# Patient Record
Sex: FEMALE | Race: WHITE | NOT HISPANIC OR LATINO | Employment: FULL TIME | URBAN - METROPOLITAN AREA
[De-identification: names, ages, dates, MRNs, and addresses within clinical notes are randomized per-mention and may not be internally consistent; named-entity substitution may affect disease eponyms.]

---

## 2021-01-16 ENCOUNTER — APPOINTMENT (EMERGENCY)
Dept: RADIOLOGY | Facility: HOSPITAL | Age: 31
DRG: 494 | End: 2021-01-16
Payer: COMMERCIAL

## 2021-01-16 ENCOUNTER — HOSPITAL ENCOUNTER (INPATIENT)
Facility: HOSPITAL | Age: 31
LOS: 2 days | Discharge: HOME | DRG: 494 | End: 2021-01-18
Attending: EMERGENCY MEDICINE | Admitting: ORTHOPAEDIC SURGERY
Payer: COMMERCIAL

## 2021-01-16 ENCOUNTER — ANESTHESIA EVENT (INPATIENT)
Dept: PERIOP | Facility: HOSPITAL | Age: 31
DRG: 494 | End: 2021-01-16
Payer: COMMERCIAL

## 2021-01-16 ENCOUNTER — APPOINTMENT (INPATIENT)
Dept: RADIOLOGY | Facility: HOSPITAL | Age: 31
DRG: 494 | End: 2021-01-16
Payer: COMMERCIAL

## 2021-01-16 DIAGNOSIS — S82.402A FRACTURE OF TIBIA AND FIBULA, SHAFT, LEFT, CLOSED, INITIAL ENCOUNTER: ICD-10-CM

## 2021-01-16 DIAGNOSIS — Z87.81 STATUS POST OPEN REDUCTION AND INTERNAL FIXATION (ORIF) OF FRACTURE: Primary | ICD-10-CM

## 2021-01-16 DIAGNOSIS — Z98.890 STATUS POST OPEN REDUCTION AND INTERNAL FIXATION (ORIF) OF FRACTURE: Primary | ICD-10-CM

## 2021-01-16 DIAGNOSIS — S82.202A FRACTURE OF TIBIA AND FIBULA, SHAFT, LEFT, CLOSED, INITIAL ENCOUNTER: ICD-10-CM

## 2021-01-16 DIAGNOSIS — S82.402A CLOSED FRACTURE SHAFT FIBULA AND TIBIA, LEFT, INITIAL ENCOUNTER: ICD-10-CM

## 2021-01-16 DIAGNOSIS — S82.202A CLOSED FRACTURE SHAFT FIBULA AND TIBIA, LEFT, INITIAL ENCOUNTER: ICD-10-CM

## 2021-01-16 PROBLEM — E55.9 VITAMIN D DEFICIENCY: Status: ACTIVE | Noted: 2021-01-16

## 2021-01-16 LAB
25(OH)D3 SERPL-MCNC: 24.3 NG/ML (ref 30–100)
ABO GROUP BLD: NORMAL
ANION GAP SERPL CALCULATED.3IONS-SCNC: 5 MMOL/L (ref 4–13)
APTT PPP: 28 SECONDS (ref 23–37)
BLD GP AB SCN SERPL QL: NEGATIVE
BUN SERPL-MCNC: 17 MG/DL (ref 5–25)
CALCIUM SERPL-MCNC: 9.4 MG/DL (ref 8.3–10.1)
CHLORIDE SERPL-SCNC: 108 MMOL/L (ref 100–108)
CO2 SERPL-SCNC: 25 MMOL/L (ref 21–32)
CREAT SERPL-MCNC: 0.83 MG/DL (ref 0.6–1.3)
ERYTHROCYTE [DISTWIDTH] IN BLOOD BY AUTOMATED COUNT: 12.9 % (ref 11.6–15.1)
FLUAV RNA RESP QL NAA+PROBE: NEGATIVE
FLUBV RNA RESP QL NAA+PROBE: NEGATIVE
GFR SERPL CREATININE-BSD FRML MDRD: 94 ML/MIN/1.73SQ M
GLUCOSE SERPL-MCNC: 81 MG/DL (ref 65–140)
HCT VFR BLD AUTO: 41.2 % (ref 34.8–46.1)
HGB BLD-MCNC: 13.3 G/DL (ref 11.5–15.4)
INR PPP: 0.96 (ref 0.84–1.19)
MCH RBC QN AUTO: 29.4 PG (ref 26.8–34.3)
MCHC RBC AUTO-ENTMCNC: 32.3 G/DL (ref 31.4–37.4)
MCV RBC AUTO: 91 FL (ref 82–98)
PLATELET # BLD AUTO: 251 THOUSANDS/UL (ref 149–390)
PMV BLD AUTO: 12.9 FL (ref 8.9–12.7)
POTASSIUM SERPL-SCNC: 3.9 MMOL/L (ref 3.5–5.3)
PROTHROMBIN TIME: 12.8 SECONDS (ref 11.6–14.5)
RBC # BLD AUTO: 4.52 MILLION/UL (ref 3.81–5.12)
RH BLD: POSITIVE
RSV RNA RESP QL NAA+PROBE: NEGATIVE
SARS-COV-2 RNA RESP QL NAA+PROBE: NEGATIVE
SODIUM SERPL-SCNC: 138 MMOL/L (ref 136–145)
SPECIMEN EXPIRATION DATE: NORMAL
WBC # BLD AUTO: 10.93 THOUSAND/UL (ref 4.31–10.16)

## 2021-01-16 PROCEDURE — 36415 COLL VENOUS BLD VENIPUNCTURE: CPT | Performed by: ORTHOPAEDIC SURGERY

## 2021-01-16 PROCEDURE — 96375 TX/PRO/DX INJ NEW DRUG ADDON: CPT

## 2021-01-16 PROCEDURE — 85610 PROTHROMBIN TIME: CPT | Performed by: ORTHOPAEDIC SURGERY

## 2021-01-16 PROCEDURE — 93005 ELECTROCARDIOGRAM TRACING: CPT

## 2021-01-16 PROCEDURE — 85730 THROMBOPLASTIN TIME PARTIAL: CPT | Performed by: ORTHOPAEDIC SURGERY

## 2021-01-16 PROCEDURE — 73590 X-RAY EXAM OF LOWER LEG: CPT

## 2021-01-16 PROCEDURE — 86900 BLOOD TYPING SEROLOGIC ABO: CPT | Performed by: ORTHOPAEDIC SURGERY

## 2021-01-16 PROCEDURE — 73564 X-RAY EXAM KNEE 4 OR MORE: CPT

## 2021-01-16 PROCEDURE — 0241U HB NFCT DS VIR RESP RNA 4 TRGT: CPT | Performed by: ORTHOPAEDIC SURGERY

## 2021-01-16 PROCEDURE — 99285 EMERGENCY DEPT VISIT HI MDM: CPT

## 2021-01-16 PROCEDURE — 86850 RBC ANTIBODY SCREEN: CPT | Performed by: ORTHOPAEDIC SURGERY

## 2021-01-16 PROCEDURE — 99285 EMERGENCY DEPT VISIT HI MDM: CPT | Performed by: EMERGENCY MEDICINE

## 2021-01-16 PROCEDURE — 96374 THER/PROPH/DIAG INJ IV PUSH: CPT

## 2021-01-16 PROCEDURE — 73610 X-RAY EXAM OF ANKLE: CPT

## 2021-01-16 PROCEDURE — 82306 VITAMIN D 25 HYDROXY: CPT | Performed by: ORTHOPAEDIC SURGERY

## 2021-01-16 PROCEDURE — 86901 BLOOD TYPING SEROLOGIC RH(D): CPT | Performed by: ORTHOPAEDIC SURGERY

## 2021-01-16 PROCEDURE — 80048 BASIC METABOLIC PNL TOTAL CA: CPT | Performed by: ORTHOPAEDIC SURGERY

## 2021-01-16 PROCEDURE — 85027 COMPLETE CBC AUTOMATED: CPT | Performed by: ORTHOPAEDIC SURGERY

## 2021-01-16 PROCEDURE — 71045 X-RAY EXAM CHEST 1 VIEW: CPT

## 2021-01-16 PROCEDURE — 73700 CT LOWER EXTREMITY W/O DYE: CPT

## 2021-01-16 PROCEDURE — 96376 TX/PRO/DX INJ SAME DRUG ADON: CPT

## 2021-01-16 RX ORDER — ACETAMINOPHEN 325 MG/1
975 TABLET ORAL EVERY 8 HOURS SCHEDULED
Status: DISCONTINUED | OUTPATIENT
Start: 2021-01-16 | End: 2021-01-17

## 2021-01-16 RX ORDER — FENTANYL CITRATE 50 UG/ML
INJECTION, SOLUTION INTRAMUSCULAR; INTRAVENOUS
Status: DISCONTINUED
Start: 2021-01-16 | End: 2021-01-16 | Stop reason: WASHOUT

## 2021-01-16 RX ORDER — HYDROMORPHONE HCL/PF 1 MG/ML
1 SYRINGE (ML) INJECTION ONCE
Status: COMPLETED | OUTPATIENT
Start: 2021-01-16 | End: 2021-01-16

## 2021-01-16 RX ORDER — HYDROMORPHONE HCL/PF 1 MG/ML
0.5 SYRINGE (ML) INJECTION
Status: DISCONTINUED | OUTPATIENT
Start: 2021-01-16 | End: 2021-01-17

## 2021-01-16 RX ORDER — SENNOSIDES 8.6 MG
1 TABLET ORAL DAILY
Status: DISCONTINUED | OUTPATIENT
Start: 2021-01-17 | End: 2021-01-17

## 2021-01-16 RX ORDER — SODIUM CHLORIDE, SODIUM LACTATE, POTASSIUM CHLORIDE, CALCIUM CHLORIDE 600; 310; 30; 20 MG/100ML; MG/100ML; MG/100ML; MG/100ML
75 INJECTION, SOLUTION INTRAVENOUS CONTINUOUS
Status: DISCONTINUED | OUTPATIENT
Start: 2021-01-17 | End: 2021-01-17

## 2021-01-16 RX ORDER — ONDANSETRON 2 MG/ML
4 INJECTION INTRAMUSCULAR; INTRAVENOUS EVERY 6 HOURS PRN
Status: DISCONTINUED | OUTPATIENT
Start: 2021-01-16 | End: 2021-01-17

## 2021-01-16 RX ORDER — FENTANYL CITRATE 50 UG/ML
50 INJECTION, SOLUTION INTRAMUSCULAR; INTRAVENOUS ONCE
Status: COMPLETED | OUTPATIENT
Start: 2021-01-16 | End: 2021-01-16

## 2021-01-16 RX ORDER — CHLORHEXIDINE GLUCONATE 0.12 MG/ML
15 RINSE ORAL ONCE
Status: CANCELLED | OUTPATIENT
Start: 2021-01-16 | End: 2021-01-16

## 2021-01-16 RX ORDER — CALCIUM CARBONATE 200(500)MG
1000 TABLET,CHEWABLE ORAL DAILY PRN
Status: DISCONTINUED | OUTPATIENT
Start: 2021-01-16 | End: 2021-01-17

## 2021-01-16 RX ORDER — OXYCODONE HYDROCHLORIDE 5 MG/1
5 TABLET ORAL EVERY 4 HOURS PRN
Status: DISCONTINUED | OUTPATIENT
Start: 2021-01-16 | End: 2021-01-17

## 2021-01-16 RX ORDER — OXYCODONE HYDROCHLORIDE 10 MG/1
10 TABLET ORAL EVERY 4 HOURS PRN
Status: DISCONTINUED | OUTPATIENT
Start: 2021-01-16 | End: 2021-01-17

## 2021-01-16 RX ORDER — DOCUSATE SODIUM 100 MG/1
100 CAPSULE, LIQUID FILLED ORAL 2 TIMES DAILY
Status: DISCONTINUED | OUTPATIENT
Start: 2021-01-16 | End: 2021-01-17

## 2021-01-16 RX ORDER — ONDANSETRON 2 MG/ML
4 INJECTION INTRAMUSCULAR; INTRAVENOUS ONCE
Status: COMPLETED | OUTPATIENT
Start: 2021-01-16 | End: 2021-01-16

## 2021-01-16 RX ADMIN — HYDROMORPHONE HYDROCHLORIDE 0.5 MG: 1 INJECTION, SOLUTION INTRAMUSCULAR; INTRAVENOUS; SUBCUTANEOUS at 21:17

## 2021-01-16 RX ADMIN — HYDROMORPHONE HYDROCHLORIDE 1 MG: 1 INJECTION, SOLUTION INTRAMUSCULAR; INTRAVENOUS; SUBCUTANEOUS at 16:07

## 2021-01-16 RX ADMIN — FENTANYL CITRATE 50 MCG: 50 INJECTION INTRAMUSCULAR; INTRAVENOUS at 17:09

## 2021-01-16 RX ADMIN — ACETAMINOPHEN 975 MG: 325 TABLET, FILM COATED ORAL at 21:19

## 2021-01-16 RX ADMIN — HYDROMORPHONE HYDROCHLORIDE 1 MG: 1 INJECTION, SOLUTION INTRAMUSCULAR; INTRAVENOUS; SUBCUTANEOUS at 18:27

## 2021-01-16 RX ADMIN — DOCUSATE SODIUM 100 MG: 100 CAPSULE, LIQUID FILLED ORAL at 21:19

## 2021-01-16 RX ADMIN — ONDANSETRON 4 MG: 2 INJECTION INTRAMUSCULAR; INTRAVENOUS at 16:07

## 2021-01-16 NOTE — H&P
Orthopedics   Rina Rock 26 y o  female MRN: [id number]  Unit/Bed#: ED 29      Chief Complaint:   left leg pain    HPI:   26 y o  female status post twisting leg injury while skiing in [facility] complaining of left leg pain and inability to bear weight  Pain is made worse with motion or contact to the area and improves somewhat with rest  Denies numbness or tingling  Denies past medical hx  She is a vegan  Works as a  in [employer], but lives in [place]  Review Of Systems:   · Skin: Normal  · Neuro: See HPI  · Musculoskeletal: See HPI  · 14 point review of systems negative except as stated above     Past Medical History:   History reviewed  No pertinent past medical history  Past Surgical History:   History reviewed  No pertinent surgical history  Family History:  Family history reviewed and non-contributory  History reviewed  No pertinent family history      Social History:  Social History     Socioeconomic History    Marital status: /Civil Union     Spouse name: None    Number of children: None    Years of education: None    Highest education level: None   Occupational History    None   Social Needs    Financial resource strain: None    Food insecurity     Worry: None     Inability: None    Transportation needs     Medical: None     Non-medical: None   Tobacco Use    Smoking status: Never Smoker    Smokeless tobacco: Never Used   Substance and Sexual Activity    Alcohol use: Never     Frequency: Never    Drug use: Never    Sexual activity: Yes     Partners: Male   Lifestyle    Physical activity     Days per week: None     Minutes per session: None    Stress: None   Relationships    Social connections     Talks on phone: None     Gets together: None     Attends [employer] service: None     Active member of club or organization: None     Attends meetings of clubs or organizations: None     Relationship status: None    Intimate partner violence     Fear of current or ex partner: None     Emotionally abused: None     Physically abused: None     Forced sexual activity: None   Other Topics Concern    None   Social History Narrative    None       Allergies:   No Known Allergies        Labs:  No results found for: HCT, HGB, PT, INR, WBC, ESR, CRP    Meds:    Current Facility-Administered Medications:     fentanyl citrate (PF) 100 MCG/2ML **ADS Override Pull**, , , ,   No current outpatient medications on file  Blood Culture:   No results found for: BLOODCX    Wound Culture:   No results found for: WOUNDCULT    Ins and Outs:  No intake/output data recorded  Physical Exam:   /82 (BP Location: Left arm)   Pulse 91   Resp 18   LMP 01/04/2021   SpO2 99%   Gen: Alert and oriented to person, place, time  HEENT: EOMI, eyes clear, moist mucus membranes, hearing intact  Respiratory: Bilateral chest rise  No audible wheezing found  Cardiovascular: Regular Rate and Rhythm  Abdomen: soft nontender/nondistended  Musculoskeletal: left lower extremity  · Skin intact  · Tender to palpation over distal tibial shaft  · Sensation intact dp/sp/tib/megan/saph  · Intact fhl/ehl  · Musculature of lower leg soft and compressible   · No pain with passive stretch  · Palpable DP pulse    Radiology:   I personally reviewed the films  X-rays left tib/fib shows distal 1/3 tibial shaft comminuted fracture with associated proximal fibular fracture    _*_*_*_*_*_*_*_*_*_*_*_*_*_*_*_*_*_*_*_*_*_*_*_*_*_*_*_*_*_*_*_*_*_*_*_*_*_*_*_*_*    Assessment:  26 y o  female status post twisting injury while skiing with left tibial shaft fracture  Placed in a long leg splint with stirrups    Plan:   · Non weight bearing left lower extremity in splint  · Analgesics for pain  · Informed consent obtained    · Pre op labs in ED  · NPO at midnight  · To OR for operative fixation of tibial shaft fracture and all indicated procedures  · Check Vitamin D levels  · Dispo: admit to ortho    Franklyn Bautista MD

## 2021-01-16 NOTE — ED NOTES
Meal tray and water provided to pt at this time - okayed with Ortho prior  Pt NPO status begins at midnight       Amy Chang RN  01/16/21 7935

## 2021-01-16 NOTE — ED ATTENDING ATTESTATION
1/16/2021  IDylan MD, saw and evaluated the patient  I have discussed the patient with the resident/non-physician practitioner and agree with the resident's/non-physician practitioner's findings, Plan of Care, and MDM as documented in the resident's/non-physician practitioner's note, except where noted  All available labs and Radiology studies were reviewed  I was present for key portions of any procedure(s) performed by the resident/non-physician practitioner and I was immediately available to provide assistance  At this point I agree with the current assessment done in the Emergency Department  I have conducted an independent evaluation of this patient a history and physical is as follows:  Pt was skiing today and lost control and twisted l foot and leg  Now  Pain Unable to ambulate after    No other injury no loc PE: alert nad neck nontender abd soft nontender heart reg lungs clear L lower ext with good pulses tender over tibia MDM: will xray splint ortho  ED Course         Critical Care Time  Procedures

## 2021-01-16 NOTE — ED PROVIDER NOTES
History  Chief Complaint   Patient presents with    Leg Injury     25 y/o female with no significant past medical history presents to the ED via EMS after a left lower leg injury sustained while skiing at [facility] today  She states that she was skiing for the first time this season and was not going particularly fast when she lost her balance and fell, however her left foot remained attached to the ski and she twisted her left leg  She was evaluated by ski rescue and was transported to the ED via ambulance with an improvised left leg splint  She currently c/o left lower leg/shin pain, aggravated with movement and palpation  She was wearing her helmet, denies LOC/headstrike, and reports no other injuries or complaints  She denies fever, chills, cough, SOB, chest pain, abdominal pain, N/V/D, urinary symptoms, back pain, neck pain, headache, numbness, and weakness  None       History reviewed  No pertinent past medical history  Past Surgical History:   Procedure Laterality Date    CA TREAT TIBIAL SHAFT FX, INTRAMED IMPLANT Left 1/17/2021    Procedure: INSERTION NAIL IM TIBIA;  Surgeon: Jessica Moran MD;  Location: [facility];  Service: Orthopedics       History reviewed  No pertinent family history  I have reviewed and agree with the history as documented  E-Cigarette/Vaping    E-Cigarette Use Never User      E-Cigarette/Vaping Substances    Nicotine No      Social History     Tobacco Use    Smoking status: Never Smoker    Smokeless tobacco: Never Used   Substance Use Topics    Alcohol use: Never     Frequency: Never    Drug use: Never        Review of Systems   Constitutional: Negative for chills and fever  HENT: Negative for congestion and sore throat  Respiratory: Negative for cough and shortness of breath  Cardiovascular: Negative for chest pain and palpitations  Gastrointestinal: Negative for abdominal pain, diarrhea, nausea and vomiting     Genitourinary: Negative for dysuria and hematuria  Musculoskeletal: Negative for back pain and neck pain  Left lower leg pain, s/p skiing injury   Skin: Negative for color change and rash  Neurological: Negative for syncope, weakness, light-headedness, numbness and headaches  All other systems reviewed and are negative  Physical Exam  ED Triage Vitals [01/16/21 1549]   Temperature Pulse Respirations Blood Pressure SpO2   98 4 °F (36 9 °C) 91 18 137/82 99 %      Temp Source Heart Rate Source Patient Position - Orthostatic VS BP Location FiO2 (%)   Oral Monitor Sitting Left arm --      Pain Score       7             Orthostatic Vital Signs  Vitals:    01/17/21 2313 01/18/21 0235 01/18/21 0747 01/18/21 1101   BP: 96/56 100/64 96/66 96/60   Pulse: 83 85 88 77   Patient Position - Orthostatic VS:           Physical Exam  Vitals signs and nursing note reviewed  Constitutional:       Appearance: Normal appearance  She is normal weight  Comments: In moderate distress secondary to LLE injury and pain  HENT:      Head: Normocephalic and atraumatic  Right Ear: External ear normal       Left Ear: External ear normal       Nose: Nose normal       Mouth/Throat:      Mouth: Mucous membranes are moist       Pharynx: Oropharynx is clear  Eyes:      Extraocular Movements: Extraocular movements intact  Conjunctiva/sclera: Conjunctivae normal       Pupils: Pupils are equal, round, and reactive to light  Neck:      Musculoskeletal: Normal range of motion and neck supple  No muscular tenderness  Cardiovascular:      Rate and Rhythm: Normal rate and regular rhythm  Pulses: Normal pulses  Heart sounds: Normal heart sounds  Pulmonary:      Effort: Pulmonary effort is normal       Breath sounds: Normal breath sounds  No wheezing or rales  Chest:      Chest wall: No tenderness  Abdominal:      General: Abdomen is flat  Bowel sounds are normal       Palpations: Abdomen is soft  Tenderness:  There is no abdominal tenderness  There is no guarding  Musculoskeletal:      Comments: Slight bony deformity to the left mid shin, with severe tenderness to palpation and movement  No hematoma  No posterior calf tenderness  No left ankle, left knee, or left hip tenderness  Pelvis stable  No midline CTLS spine tenderness  No other injury  Skin:     General: Skin is warm and dry  Capillary Refill: Capillary refill takes less than 2 seconds  Neurological:      General: No focal deficit present  Mental Status: She is alert and oriented to person, place, and time  Sensory: No sensory deficit  Motor: No weakness  ED Medications  Medications   HYDROmorphone (DILAUDID) injection 1 mg (1 mg Intravenous Given 1/16/21 1607)   ondansetron (ZOFRAN) injection 4 mg (4 mg Intravenous Given 1/16/21 1607)   fentanyl citrate (PF) 100 MCG/2ML 50 mcg (50 mcg Intravenous Given 1/16/21 1709)   HYDROmorphone (DILAUDID) injection 1 mg (1 mg Intravenous Given 1/16/21 1827)   fentaNYL (SUBLIMAZE) injection 25 mcg (25 mcg Intravenous Given 1/17/21 1147)   HYDROmorphone (DILAUDID) injection 0 5 mg (0 5 mg Intravenous Given 1/17/21 1204)   ceFAZolin (ANCEF) IVPB (premix in dextrose) 2,000 mg 50 mL (0 mg Intravenous Stopped 1/18/21 1000)   HYDROmorphone (DILAUDID) injection 0 5 mg (0 5 mg Intravenous Given 1/17/21 1233)       Diagnostic Studies  Results Reviewed     Procedure Component Value Units Date/Time    COVID19, Influenza A/B, RSV PCR, [facility] [[id number]]  (Normal) Collected: 01/16/21 1834    Lab Status: Final result Specimen: Nares from Nose Updated: 01/16/21 1938     SARS-CoV-2 Negative     INFLUENZA A PCR Negative     INFLUENZA B PCR Negative     RSV PCR Negative    Narrative: This test has been authorized by FDA under an EUA (Emergency Use Assay) for use by authorized laboratories    Clinical caution and judgement should be used with the interpretation of these results with consideration of the clinical impression and other laboratory testing  Testing reported as "Positive" or "Negative" has been proven to be accurate according to standard laboratory validation requirements  All testing is performed with control materials showing appropriate reactivity at standard intervals  Vitamin D 25 hydroxy [[id number]]  (Abnormal) Collected: 01/16/21 1835    Lab Status: Final result Specimen: Blood from Line Updated: 01/16/21 1914     Vit D, 25-Hydroxy 24 3 ng/mL     Narrative: This assay is a certified procedure of the CDC Vitamin D Standardization Certification Program (VDSCP)     Deficiency <20ng/ml   Insufficiency 20-30ng/ml   Sufficient  ng/ml     *Patients undergoing fluorescein dye angiography may retain small amounts of fluorescein in the body for 48-72 hours post procedure  Samples containing fluorescein can produce falsely elevated Vitamin D values  If the patient had this procedure, a specimen should be resubmitted post fluorescein clearance        Basic metabolic panel [[id number]] Collected: 01/16/21 1818    Lab Status: Final result Specimen: Blood from Arm, Left Updated: 01/16/21 1849     Sodium 138 mmol/L      Potassium 3 9 mmol/L      Chloride 108 mmol/L      CO2 25 mmol/L      ANION GAP 5 mmol/L      BUN 17 mg/dL      Creatinine 0 83 mg/dL      Glucose 81 mg/dL      Calcium 9 4 mg/dL      eGFR 94 ml/min/1 73sq m     Narrative:      Cass guidelines for Chronic Kidney Disease (CKD):     Stage 1 with normal or high GFR (GFR > 90 mL/min/1 73 square meters)    Stage 2 Mild CKD (GFR = 60-89 mL/min/1 73 square meters)    Stage 3A Moderate CKD (GFR = 45-59 mL/min/1 73 square meters)    Stage 3B Moderate CKD (GFR = 30-44 mL/min/1 73 square meters)    Stage 4 Severe CKD (GFR = 15-29 mL/min/1 73 square meters)    Stage 5 End Stage CKD (GFR <15 mL/min/1 73 square meters)  Note: GFR calculation is accurate only with a steady state creatinine    Protime-INR [[id number]] (Normal) Collected: 01/16/21 1818    Lab Status: Final result Specimen: Blood from Arm, Left Updated: 01/16/21 1847     Protime 12 8 seconds      INR 0 96    APTT [[id number]]  (Normal) Collected: 01/16/21 1818    Lab Status: Final result Specimen: Blood from Arm, Left Updated: 01/16/21 1847     PTT 28 seconds     CBC [[id number]]  (Abnormal) Collected: 01/16/21 1818    Lab Status: Final result Specimen: Blood from Arm, Left Updated: 01/16/21 1830     WBC 10 93 Thousand/uL      RBC 4 52 Million/uL      Hemoglobin 13 3 g/dL      Hematocrit 41 2 %      MCV 91 fL      MCH 29 4 pg      MCHC 32 3 g/dL      RDW 12 9 %      Platelets 185 Thousands/uL      MPV 12 9 fL                  XR tibia fibula 2 vw left   Final Result by Susie Stover MD (01/18 2214)      Procedure guidance  Please refer to the separate procedure notes for additional details  Workstation performed: [id number]         CT lower extremity wo contrast left   Final Result by Adonay Yao MD (01/17 8152)      Comminuted fracture at the distal left tibia with medial angulation of distal fracture fragment  No evidence of posterior malleolar fracture  Workstation performed: [id number]         XR chest portable   Final Result by Dg Hess MD (01/17 0160)      No acute cardiopulmonary disease  Workstation performed: [id number]         XR tibia fibula 2 views LEFT   Final Result by Pio Cardona MD (01/17 7370)      Comminuted left distal tibial shaft and proximal fibular shaft fractures  Workstation performed: [id number]         XR knee 4+ views left injury   Final Result by Jonathan Lane DO (01/17 9552)   Oblique comminuted fracture proximal fibular diaphysis with satisfactory approximation of the fragments  No additional fractures are found              Workstation performed: [id number]         XR ankle 3+ views LEFT   Final Result by Pio Cardona MD (01/17 3551)      Comminuted and mildly displaced left distal tibial shaft fracture with a large butterfly fragment  Workstation performed: [id number]               Procedures  Procedures      ED Course                                       MDM  Number of Diagnoses or Management Options  Diagnosis management comments: 25 y/o female with no significant past medical history presents to the ED via EMS after a left lower leg injury sustained while skiing at [facility] today  She fell and twisted her left leg while still attached to the ski  Currently c/o left lower anterior shin pain  + helmet, no head strike or LOC  No other injury  On exam she is afebrile, VSS, with a slight bony deformity to the left mid shin, with severe tenderness to palpation and movement  No hematoma  No posterior calf tenderness  No left ankle, left knee, or left hip tenderness  Pelvis stable  No midline CTLS spine tenderness  No other injury  X-ray reveals left comminuted distal tibia shaft spiral fracture with proximal left fibular fracture  Discussed findings and indication for admission with the patient and she understands and agrees  Case discussed with Dr Yogesh Acosta, orthopedics on call, who evaluated the patient in the ED and splinted the patient's left leg, with plan to admit for operative repair tomorrow        Disposition  Final diagnoses:   Closed fracture shaft fibula and tibia, left, initial encounter   Status post left tibia IM nail insertion    Fracture of tibia and fibula, shaft, left, closed, initial encounter     Time reflects when diagnosis was documented in both MDM as applicable and the Disposition within this note     Time User Action Codes Description Comment    1/16/2021  7:23 PM Hiam Crum Add [H70 027S,  S82 402A] Closed fracture shaft fibula and tibia, left, initial encounter     1/18/2021  8:40 AM Ingrid Dill Modify [B75 104W,  H72 402A] Closed fracture shaft fibula and tibia, left, initial encounter     1/18/2021  8:40 AM Rivas Moscow, Jackie Page [Y69 258,  F21 06] Status post left tibia IM nail insertion      1/18/2021 11:34 AM Justen Villar [L54 237Y,  S82 402A] Fracture of tibia and fibula, shaft, left, closed, initial encounter       ED Disposition     None      Follow-up Information     Follow up With Specialties Details Why Contact Info    Joe Scott MD Orthopedic Surgery Follow up in 2 week(s)  [facility]             Discharge Medication List as of 1/18/2021 11:53 AM      START taking these medications    Details   acetaminophen (TYLENOL) 325 mg tablet Take 2 tablets (650 mg total) by mouth every 6 (six) hours as needed for mild pain for up to 14 days, Starting Mon 1/18/2021, Until Mon 2/1/2021, Normal      docusate sodium (COLACE) 100 mg capsule Take 1 capsule (100 mg total) by mouth 2 (two) times a day, Starting Mon 1/18/2021, Normal      enoxaparin (LOVENOX) 40 mg/0 4 mL Inject 0 4 mL (40 mg total) under the skin daily for 28 days To start post op, Starting Mon 1/18/2021, Until Mon 2/15/2021, Normal      oxyCODONE (ROXICODONE) 5 mg immediate release tablet 1 pill po Q4 Hrs prn, Normal      senna (SENOKOT) 8 6 mg Take 1 tablet (8 6 mg total) by mouth daily at bedtime for 14 days, Starting Mon 1/18/2021, Until Mon 2/1/2021, Normal           Outpatient Discharge Orders   Walker Rolling     Commode chair     Ambulatory referral to Physical Therapy   Standing Status: Future Standing Exp   Date: 01/18/22      Discharge Diet     Discharge Diet     Activity as tolerated     Weight Bearing Restrictions     No driving until     Call provider for:  severe uncontrolled pain     Call provider for:  redness, tenderness, or signs of infection (pain, swelling, redness, odor or green/yellow discharge around incision site)     Call provider for: active or persistent bleeding     Leave dressing on - Keep it clean, dry, and intact until clinic visit     Activity as tolerated     Weight Bearing Restrictions No driving until     Call provider for:  severe uncontrolled pain     Call provider for:  redness, tenderness, or signs of infection (pain, swelling, redness, odor or green/yellow discharge around incision site)     Call provider for: active or persistent bleeding     Leave dressing on - Keep it clean, dry, and intact until clinic visit       PDMP Review     None           ED Provider  Attending physically available and evaluated Rina Shweta GOMES managed the patient along with the ED Attending      Electronically Signed by         Jose Jaramillo MD  01/20/21 4032

## 2021-01-17 ENCOUNTER — APPOINTMENT (INPATIENT)
Dept: RADIOLOGY | Facility: HOSPITAL | Age: 31
DRG: 494 | End: 2021-01-17
Payer: COMMERCIAL

## 2021-01-17 ENCOUNTER — ANESTHESIA (INPATIENT)
Dept: PERIOP | Facility: HOSPITAL | Age: 31
DRG: 494 | End: 2021-01-17
Payer: COMMERCIAL

## 2021-01-17 VITALS — HEART RATE: 86 BPM

## 2021-01-17 PROCEDURE — 27759 TREATMENT OF TIBIA FRACTURE: CPT | Performed by: ORTHOPAEDIC SURGERY

## 2021-01-17 PROCEDURE — C1713 ANCHOR/SCREW BN/BN,TIS/BN: HCPCS | Performed by: ORTHOPAEDIC SURGERY

## 2021-01-17 PROCEDURE — NC001 PR NO CHARGE: Performed by: ORTHOPAEDIC SURGERY

## 2021-01-17 PROCEDURE — 99223 1ST HOSP IP/OBS HIGH 75: CPT | Performed by: ORTHOPAEDIC SURGERY

## 2021-01-17 PROCEDURE — C1769 GUIDE WIRE: HCPCS | Performed by: ORTHOPAEDIC SURGERY

## 2021-01-17 PROCEDURE — 73590 X-RAY EXAM OF LOWER LEG: CPT

## 2021-01-17 PROCEDURE — 0QSH06Z REPOSITION LEFT TIBIA WITH INTRAMEDULLARY INTERNAL FIXATION DEVICE, OPEN APPROACH: ICD-10-PCS | Performed by: ORTHOPAEDIC SURGERY

## 2021-01-17 DEVICE — 4.0MM TI LOCKING SCREW W/T25 STARDRIVE 28MM F/IM NAILS-STER: Type: IMPLANTABLE DEVICE | Site: TIBIA | Status: FUNCTIONAL

## 2021-01-17 DEVICE — 4.0MM TI LOCKING SCREW W/T25 STARDRIVE 32MM F/IM NAILS-STER: Type: IMPLANTABLE DEVICE | Site: TIBIA | Status: FUNCTIONAL

## 2021-01-17 DEVICE — 8MM TI CANN TIBIAL NAIL-EX W/PROX BEND 315MM-STERILE
Type: IMPLANTABLE DEVICE | Site: TIBIA | Status: FUNCTIONAL
Brand: EXPERT NAIL

## 2021-01-17 DEVICE — 4.0MM TI LOCKING SCREW W/T25 STARDRIVE 30MM F/IM NAILS-STER: Type: IMPLANTABLE DEVICE | Site: TIBIA | Status: FUNCTIONAL

## 2021-01-17 RX ORDER — HYDROMORPHONE HCL/PF 1 MG/ML
0.5 SYRINGE (ML) INJECTION
Status: DISCONTINUED | OUTPATIENT
Start: 2021-01-17 | End: 2021-01-17

## 2021-01-17 RX ORDER — HYDROMORPHONE HCL/PF 1 MG/ML
0.5 SYRINGE (ML) INJECTION
Status: COMPLETED | OUTPATIENT
Start: 2021-01-17 | End: 2021-01-17

## 2021-01-17 RX ORDER — ACETAMINOPHEN 325 MG/1
650 TABLET ORAL EVERY 6 HOURS SCHEDULED
Status: DISCONTINUED | OUTPATIENT
Start: 2021-01-17 | End: 2021-01-18 | Stop reason: HOSPADM

## 2021-01-17 RX ORDER — OXYCODONE HYDROCHLORIDE 5 MG/1
5 TABLET ORAL EVERY 4 HOURS PRN
Status: DISCONTINUED | OUTPATIENT
Start: 2021-01-17 | End: 2021-01-18 | Stop reason: HOSPADM

## 2021-01-17 RX ORDER — ONDANSETRON 2 MG/ML
4 INJECTION INTRAMUSCULAR; INTRAVENOUS EVERY 4 HOURS PRN
Status: DISCONTINUED | OUTPATIENT
Start: 2021-01-17 | End: 2021-01-18 | Stop reason: HOSPADM

## 2021-01-17 RX ORDER — KETAMINE HCL IN NACL, ISO-OSM 100MG/10ML
SYRINGE (ML) INJECTION AS NEEDED
Status: DISCONTINUED | OUTPATIENT
Start: 2021-01-17 | End: 2021-01-17

## 2021-01-17 RX ORDER — HYDROMORPHONE HCL/PF 1 MG/ML
0.5 SYRINGE (ML) INJECTION
Status: DISCONTINUED | OUTPATIENT
Start: 2021-01-17 | End: 2021-01-18 | Stop reason: HOSPADM

## 2021-01-17 RX ORDER — ONDANSETRON 2 MG/ML
INJECTION INTRAMUSCULAR; INTRAVENOUS AS NEEDED
Status: DISCONTINUED | OUTPATIENT
Start: 2021-01-17 | End: 2021-01-17

## 2021-01-17 RX ORDER — FENTANYL CITRATE 50 UG/ML
INJECTION, SOLUTION INTRAMUSCULAR; INTRAVENOUS AS NEEDED
Status: DISCONTINUED | OUTPATIENT
Start: 2021-01-17 | End: 2021-01-17

## 2021-01-17 RX ORDER — ALBUMIN, HUMAN INJ 5% 5 %
SOLUTION INTRAVENOUS CONTINUOUS PRN
Status: DISCONTINUED | OUTPATIENT
Start: 2021-01-17 | End: 2021-01-17

## 2021-01-17 RX ORDER — DEXAMETHASONE SODIUM PHOSPHATE 10 MG/ML
INJECTION, SOLUTION INTRAMUSCULAR; INTRAVENOUS AS NEEDED
Status: DISCONTINUED | OUTPATIENT
Start: 2021-01-17 | End: 2021-01-17

## 2021-01-17 RX ORDER — DOCUSATE SODIUM 100 MG/1
100 CAPSULE, LIQUID FILLED ORAL 2 TIMES DAILY
Status: DISCONTINUED | OUTPATIENT
Start: 2021-01-17 | End: 2021-01-18 | Stop reason: HOSPADM

## 2021-01-17 RX ORDER — SUCCINYLCHOLINE/SOD CL,ISO/PF 100 MG/5ML
SYRINGE (ML) INTRAVENOUS AS NEEDED
Status: DISCONTINUED | OUTPATIENT
Start: 2021-01-17 | End: 2021-01-17

## 2021-01-17 RX ORDER — OXYCODONE HYDROCHLORIDE 10 MG/1
10 TABLET ORAL EVERY 4 HOURS PRN
Status: DISCONTINUED | OUTPATIENT
Start: 2021-01-17 | End: 2021-01-18 | Stop reason: HOSPADM

## 2021-01-17 RX ORDER — ONDANSETRON 2 MG/ML
4 INJECTION INTRAMUSCULAR; INTRAVENOUS EVERY 8 HOURS PRN
Status: DISCONTINUED | OUTPATIENT
Start: 2021-01-17 | End: 2021-01-17

## 2021-01-17 RX ORDER — MAGNESIUM HYDROXIDE 1200 MG/15ML
LIQUID ORAL AS NEEDED
Status: DISCONTINUED | OUTPATIENT
Start: 2021-01-17 | End: 2021-01-17 | Stop reason: HOSPADM

## 2021-01-17 RX ORDER — CEFAZOLIN SODIUM 2 G/50ML
2000 SOLUTION INTRAVENOUS EVERY 8 HOURS
Status: COMPLETED | OUTPATIENT
Start: 2021-01-17 | End: 2021-01-18

## 2021-01-17 RX ORDER — MIDAZOLAM HYDROCHLORIDE 2 MG/2ML
INJECTION, SOLUTION INTRAMUSCULAR; INTRAVENOUS AS NEEDED
Status: DISCONTINUED | OUTPATIENT
Start: 2021-01-17 | End: 2021-01-17

## 2021-01-17 RX ORDER — HYDROMORPHONE HCL 110MG/55ML
PATIENT CONTROLLED ANALGESIA SYRINGE INTRAVENOUS AS NEEDED
Status: DISCONTINUED | OUTPATIENT
Start: 2021-01-17 | End: 2021-01-17

## 2021-01-17 RX ORDER — FENTANYL CITRATE/PF 50 MCG/ML
25 SYRINGE (ML) INJECTION
Status: COMPLETED | OUTPATIENT
Start: 2021-01-17 | End: 2021-01-17

## 2021-01-17 RX ORDER — CALCIUM CARBONATE 200(500)MG
1000 TABLET,CHEWABLE ORAL DAILY PRN
Status: DISCONTINUED | OUTPATIENT
Start: 2021-01-17 | End: 2021-01-18 | Stop reason: HOSPADM

## 2021-01-17 RX ORDER — CEFAZOLIN SODIUM 2 G/50ML
SOLUTION INTRAVENOUS AS NEEDED
Status: DISCONTINUED | OUTPATIENT
Start: 2021-01-17 | End: 2021-01-17

## 2021-01-17 RX ORDER — SENNOSIDES 8.6 MG
1 TABLET ORAL
Status: DISCONTINUED | OUTPATIENT
Start: 2021-01-17 | End: 2021-01-18 | Stop reason: HOSPADM

## 2021-01-17 RX ORDER — PROPOFOL 10 MG/ML
INJECTION, EMULSION INTRAVENOUS AS NEEDED
Status: DISCONTINUED | OUTPATIENT
Start: 2021-01-17 | End: 2021-01-17

## 2021-01-17 RX ADMIN — ONDANSETRON 4 MG: 2 INJECTION INTRAMUSCULAR; INTRAVENOUS at 10:17

## 2021-01-17 RX ADMIN — ACETAMINOPHEN 650 MG: 325 TABLET, FILM COATED ORAL at 17:59

## 2021-01-17 RX ADMIN — HYDROMORPHONE HYDROCHLORIDE 0.5 MG: 1 INJECTION, SOLUTION INTRAMUSCULAR; INTRAVENOUS; SUBCUTANEOUS at 12:09

## 2021-01-17 RX ADMIN — DOCUSATE SODIUM 100 MG: 100 CAPSULE, LIQUID FILLED ORAL at 17:59

## 2021-01-17 RX ADMIN — Medication 25 MG: at 10:09

## 2021-01-17 RX ADMIN — Medication 100 MG: at 09:59

## 2021-01-17 RX ADMIN — PHENYLEPHRINE HYDROCHLORIDE 100 MCG: 10 INJECTION INTRAVENOUS at 10:43

## 2021-01-17 RX ADMIN — CEFAZOLIN SODIUM 2000 MG: 2 SOLUTION INTRAVENOUS at 17:59

## 2021-01-17 RX ADMIN — HYDROMORPHONE HYDROCHLORIDE 0.5 MG: 1 INJECTION, SOLUTION INTRAMUSCULAR; INTRAVENOUS; SUBCUTANEOUS at 12:04

## 2021-01-17 RX ADMIN — CEFAZOLIN SODIUM 2000 MG: 2 SOLUTION INTRAVENOUS at 10:00

## 2021-01-17 RX ADMIN — PROPOFOL 200 MG: 10 INJECTION, EMULSION INTRAVENOUS at 09:59

## 2021-01-17 RX ADMIN — FENTANYL CITRATE 100 MCG: 50 INJECTION INTRAMUSCULAR; INTRAVENOUS at 09:59

## 2021-01-17 RX ADMIN — SODIUM CHLORIDE, SODIUM LACTATE, POTASSIUM CHLORIDE, AND CALCIUM CHLORIDE 75 ML/HR: .6; .31; .03; .02 INJECTION, SOLUTION INTRAVENOUS at 12:51

## 2021-01-17 RX ADMIN — SODIUM CHLORIDE, SODIUM LACTATE, POTASSIUM CHLORIDE, AND CALCIUM CHLORIDE 75 ML/HR: .6; .31; .03; .02 INJECTION, SOLUTION INTRAVENOUS at 00:32

## 2021-01-17 RX ADMIN — Medication 25 MCG: at 11:32

## 2021-01-17 RX ADMIN — HYDROMORPHONE HYDROCHLORIDE 0.5 MG: 2 INJECTION, SOLUTION INTRAMUSCULAR; INTRAVENOUS; SUBCUTANEOUS at 10:39

## 2021-01-17 RX ADMIN — HYDROMORPHONE HYDROCHLORIDE 0.5 MG: 1 INJECTION, SOLUTION INTRAMUSCULAR; INTRAVENOUS; SUBCUTANEOUS at 12:33

## 2021-01-17 RX ADMIN — HYDROMORPHONE HYDROCHLORIDE 0.5 MG: 1 INJECTION, SOLUTION INTRAMUSCULAR; INTRAVENOUS; SUBCUTANEOUS at 11:54

## 2021-01-17 RX ADMIN — ONDANSETRON 4 MG: 2 INJECTION INTRAMUSCULAR; INTRAVENOUS at 19:44

## 2021-01-17 RX ADMIN — MIDAZOLAM 2 MG: 1 INJECTION INTRAMUSCULAR; INTRAVENOUS at 09:56

## 2021-01-17 RX ADMIN — SODIUM CHLORIDE, SODIUM LACTATE, POTASSIUM CHLORIDE, AND CALCIUM CHLORIDE: .6; .31; .03; .02 INJECTION, SOLUTION INTRAVENOUS at 10:52

## 2021-01-17 RX ADMIN — HYDROMORPHONE HYDROCHLORIDE 0.5 MG: 1 INJECTION, SOLUTION INTRAMUSCULAR; INTRAVENOUS; SUBCUTANEOUS at 11:49

## 2021-01-17 RX ADMIN — HYDROMORPHONE HYDROCHLORIDE 0.5 MG: 1 INJECTION, SOLUTION INTRAMUSCULAR; INTRAVENOUS; SUBCUTANEOUS at 04:47

## 2021-01-17 RX ADMIN — ALBUMIN (HUMAN): 12.5 INJECTION, SOLUTION INTRAVENOUS at 10:45

## 2021-01-17 RX ADMIN — Medication 25 MCG: at 11:42

## 2021-01-17 RX ADMIN — ONDANSETRON 4 MG: 2 INJECTION INTRAMUSCULAR; INTRAVENOUS at 14:51

## 2021-01-17 RX ADMIN — DEXAMETHASONE SODIUM PHOSPHATE 5 MG: 10 INJECTION, SOLUTION INTRAMUSCULAR; INTRAVENOUS at 10:17

## 2021-01-17 RX ADMIN — HYDROMORPHONE HYDROCHLORIDE 0.5 MG: 1 INJECTION, SOLUTION INTRAMUSCULAR; INTRAVENOUS; SUBCUTANEOUS at 12:23

## 2021-01-17 RX ADMIN — SENNOSIDES 8.6 MG: 8.6 TABLET, FILM COATED ORAL at 21:54

## 2021-01-17 RX ADMIN — HYDROMORPHONE HYDROCHLORIDE 0.5 MG: 1 INJECTION, SOLUTION INTRAMUSCULAR; INTRAVENOUS; SUBCUTANEOUS at 11:59

## 2021-01-17 RX ADMIN — Medication 25 MCG: at 11:37

## 2021-01-17 RX ADMIN — HYDROMORPHONE HYDROCHLORIDE 0.5 MG: 1 INJECTION, SOLUTION INTRAMUSCULAR; INTRAVENOUS; SUBCUTANEOUS at 12:16

## 2021-01-17 RX ADMIN — Medication 25 MCG: at 11:47

## 2021-01-17 RX ADMIN — OXYCODONE HYDROCHLORIDE 10 MG: 10 TABLET ORAL at 19:44

## 2021-01-17 NOTE — ANESTHESIA PREPROCEDURE EVALUATION
Procedure:  INSERTION NAIL IM TIBIA (Left Leg Lower)    Relevant Problems   No relevant active problems      twisting leg injury while skiing in [facility] complaining of left leg pain and inability to bear weight  Physical Exam    Airway    Mallampati score: II  TM Distance: >3 FB  Neck ROM: full     Dental   No notable dental hx     Cardiovascular      Pulmonary      Other Findings        Anesthesia Plan  ASA Score- 2     Anesthesia Type- general with ASA Monitors  Additional Monitors:   Airway Plan: ETT  Plan Factors-    Chart reviewed  Existing labs reviewed  Patient is not a current smoker  Patient did not smoke on day of surgery  Induction- intravenous  Postoperative Plan-   Planned trial extubation    Informed Consent- Anesthetic plan and risks discussed with patient  I personally reviewed this patient with the CRNA  Discussed and agreed on the Anesthesia Plan with the CRNA  Brenton Torres

## 2021-01-17 NOTE — ORTHOTIC NOTE
Orthotic Note            Date: 1/17/2021      Patient Name: [facility]            Reason for Consult:  Patient Active Problem List   Diagnosis    Fracture of tibia and fibula, shaft, left, closed, initial encounter    Vitamin D deficiency     Tall Cam Walker Boot LLE    Attempted to fit pt in a Tall Cam Walker Boot to LLE  Per RN pt is not currently appropriate for fitting due to pain  [employer] will continue to follow and fit when appropriate  Pt shoe size is 7  Recommendations:  Please call Mobility Coordinator at ext  [phone] in regards to bracing instruction and/or adjustment    Lyn Branch Restorative Technician, BS

## 2021-01-17 NOTE — OP NOTE
INSERTION NAIL IM TIBIA  Postoperative Note  PATIENT NAME: Rina Rock  : 1990  MRN: [id number]  [facility] OR ROOM 09    Surgery Date: 2021    Preop Diagnosis:  Closed fracture shaft fibula and tibia, left, initial encounter [S8A, S82 402A]    Post-Op Diagnosis Codes:     * Closed fracture shaft fibula and tibia, left, initial encounter [S8A, S8 402A]    Procedure(s) (LRB):  INSERTION NAIL IM TIBIA (Left)   ORIF left tib-fib with IM nail    Surgeon(s) and Role:     * Khang Chaney MD - Primary     * Inna Haile MD - Assisting     * Rhett Oliveira MD - Assisting    Specimens:  * No specimens in log *    Estimated Blood Loss:   Minimal    Anesthesia Type:   General     Findings:    8 x 315 statically locked nail    Procedure: Following induction of adequate level of general anesthesia, the left lower extremity then underwent sterile prep and drape  Antibiotics administered  No tourniquet utilized  An anterolateral skin incision was created the knee joint  Full-thickness flaps raised get the extensor mechanism  An anterolateral arthrotomy was created open up the knee  Retropatellar fat pad was excised  Proper starting point for the nail was identified AP lateral fluoroscopy  A beaded tip guide lyn was placed down the medic canal the tibia across well reduced fracture into the center of the distal fracture fragment as AP lateral fluoroscopy  Reaming as this patient very tight canal started with a 7 mm Reamer, extended up to 9 5 which point time excellent cortical chatter was obtained  Utilizing nail measuring stick, the appropriately size nail was identified, selected, introduced in cannulated fashion  When it was sunk to appropriate depth, the beaded tip guide lyn was were drawn    Proximal lock was performed utilizing out  device, it was 2 screws in nature placed medial to lateral   Once length rotation deemed acceptable distal lock was performed utilizing perfect Knik technique, freehand targeting  Final fluoroscopic films document a well-aligned fracture, good hardware position  Satisfied with the extent of surgery, the wounds then flushed with saline closed  Deep layers were closed number Vicryl suture  The subcu tissue closed 2 Vicryl suture  Skin was closed to a nylons  Sterile dressings were applied  She was then awakened from general anesthesia, taken recovery room stable condition plans to consist of physical therapy weight-bearing to tolerance when she gets a cam walker on  Complications:   None    Dispo:     To PACU    SIGNATURE: Dory Carreon MD   DATE: January 17, 2021   TIME: 11:01 AM

## 2021-01-17 NOTE — ANESTHESIA POSTPROCEDURE EVALUATION
Post-Op Assessment Note    CV Status:  Stable  Pain Score: 0    Pain management: adequate     Mental Status:  Alert and awake   Hydration Status:  Euvolemic   PONV Controlled:  Controlled   Airway Patency:  Patent  Airway: intubated   Two or more mitigation strategies used for obstructive sleep apnea   Post Op Vitals Reviewed: Yes      Staff: CRNA         No complications documented      BP      Temp (P) 98 °F (36 7 °C) (01/17/21 1124)    Pulse (P) 95 (01/17/21 1124)   Resp      SpO2 (P) 100 % (01/17/21 1124)

## 2021-01-17 NOTE — DISCHARGE INSTRUCTIONS
Discharge Instructions - Orthopedics  Rina Rock 26 y o  female MRN: [id number]  Unit/Bed#: PACU 03    Weight Bearing Status:                                           Weightbearing as tolerated left lower extremity in CAM boot     DVT prophylaxis  Lovenox as prescribed for 28 days following surgery     Pain:  Continue analgesics as directed    Dressing Instructions:   Please keep clean, dry and intact until follow up     Appt Instructions: If you do not have your appointment, please call the clinic at [phone] to schedule follow up with Dr Annia Quiroz in 2 weeks   Otherwise followup as scheduled     Contact the office sooner if you experience any increased numbness/tingling in the extremities        Miscellaneous:  None

## 2021-01-17 NOTE — PROGRESS NOTES
Subjective:  Was able to slip little bit overnight  Her pain is well controlled this time, denies numbness tingling  Objective:  A 10 point ROS was performed; negative except as noted above  Lab Results   Component Value Date/Time    WBC 10 93 (H) 01/16/2021 06:18 PM    HGB 13 3 01/16/2021 06:18 PM       Vitals:    01/16/21 2103   BP: 98/64   Pulse: 86   Resp:    Temp: 98 8 °F (37 1 °C)   SpO2: 96%     Left lower extremity  Splint C/D/I  Positive EHL/FHL  Sensation intact in all exposed toes and 1st dorsal web space  Toes warm and well perfused    Assessment: 26 y o  female with Left Tib-fib shaft fractures  Plan:  NWB LLE  Pain control  DVT ppx:   On hold this morning for OR  PT/OT post op  NPO/IV fluids  Will continue to assess for acute blood loss anemia  Dispo: Ortho will follow

## 2021-01-18 VITALS
RESPIRATION RATE: 16 BRPM | DIASTOLIC BLOOD PRESSURE: 60 MMHG | HEART RATE: 77 BPM | SYSTOLIC BLOOD PRESSURE: 96 MMHG | OXYGEN SATURATION: 100 % | TEMPERATURE: 98.5 F

## 2021-01-18 PROBLEM — Z87.81 STATUS POST OPEN REDUCTION AND INTERNAL FIXATION (ORIF) OF FRACTURE: Status: ACTIVE | Noted: 2021-01-18

## 2021-01-18 PROBLEM — Z98.890 STATUS POST OPEN REDUCTION AND INTERNAL FIXATION (ORIF) OF FRACTURE: Status: ACTIVE | Noted: 2021-01-18

## 2021-01-18 LAB
ANION GAP SERPL CALCULATED.3IONS-SCNC: 3 MMOL/L (ref 4–13)
BUN SERPL-MCNC: 12 MG/DL (ref 5–25)
CALCIUM SERPL-MCNC: 8.8 MG/DL (ref 8.3–10.1)
CHLORIDE SERPL-SCNC: 111 MMOL/L (ref 100–108)
CO2 SERPL-SCNC: 27 MMOL/L (ref 21–32)
CREAT SERPL-MCNC: 0.86 MG/DL (ref 0.6–1.3)
ERYTHROCYTE [DISTWIDTH] IN BLOOD BY AUTOMATED COUNT: 13.1 % (ref 11.6–15.1)
GFR SERPL CREATININE-BSD FRML MDRD: 90 ML/MIN/1.73SQ M
GLUCOSE SERPL-MCNC: 121 MG/DL (ref 65–140)
HCT VFR BLD AUTO: 33 % (ref 34.8–46.1)
HGB BLD-MCNC: 10.4 G/DL (ref 11.5–15.4)
MCH RBC QN AUTO: 29.6 PG (ref 26.8–34.3)
MCHC RBC AUTO-ENTMCNC: 31.5 G/DL (ref 31.4–37.4)
MCV RBC AUTO: 94 FL (ref 82–98)
PLATELET # BLD AUTO: 191 THOUSANDS/UL (ref 149–390)
PMV BLD AUTO: 12.9 FL (ref 8.9–12.7)
POTASSIUM SERPL-SCNC: 4.2 MMOL/L (ref 3.5–5.3)
RBC # BLD AUTO: 3.51 MILLION/UL (ref 3.81–5.12)
SODIUM SERPL-SCNC: 141 MMOL/L (ref 136–145)
WBC # BLD AUTO: 10.94 THOUSAND/UL (ref 4.31–10.16)

## 2021-01-18 PROCEDURE — 99024 POSTOP FOLLOW-UP VISIT: CPT | Performed by: ORTHOPAEDIC SURGERY

## 2021-01-18 PROCEDURE — NC001 PR NO CHARGE: Performed by: ORTHOPAEDIC SURGERY

## 2021-01-18 PROCEDURE — 97166 OT EVAL MOD COMPLEX 45 MIN: CPT

## 2021-01-18 PROCEDURE — 97163 PT EVAL HIGH COMPLEX 45 MIN: CPT

## 2021-01-18 PROCEDURE — 80048 BASIC METABOLIC PNL TOTAL CA: CPT | Performed by: ORTHOPAEDIC SURGERY

## 2021-01-18 PROCEDURE — 85027 COMPLETE CBC AUTOMATED: CPT | Performed by: ORTHOPAEDIC SURGERY

## 2021-01-18 RX ORDER — DOCUSATE SODIUM 100 MG/1
100 CAPSULE, LIQUID FILLED ORAL 2 TIMES DAILY
Qty: 10 CAPSULE | Refills: 0 | Status: SHIPPED | OUTPATIENT
Start: 2021-01-18

## 2021-01-18 RX ORDER — ACETAMINOPHEN 325 MG/1
650 TABLET ORAL EVERY 6 HOURS PRN
Qty: 112 TABLET | Refills: 0 | Status: SHIPPED | OUTPATIENT
Start: 2021-01-18 | End: 2021-02-01

## 2021-01-18 RX ORDER — OXYCODONE HYDROCHLORIDE 5 MG/1
TABLET ORAL
Qty: 30 TABLET | Refills: 0 | Status: SHIPPED | OUTPATIENT
Start: 2021-01-18

## 2021-01-18 RX ORDER — SENNOSIDES 8.6 MG
8.6 TABLET ORAL
Qty: 14 TABLET | Refills: 0 | Status: SHIPPED | OUTPATIENT
Start: 2021-01-18 | End: 2021-02-01

## 2021-01-18 RX ADMIN — ACETAMINOPHEN 650 MG: 325 TABLET, FILM COATED ORAL at 06:04

## 2021-01-18 RX ADMIN — ENOXAPARIN SODIUM 40 MG: 40 INJECTION SUBCUTANEOUS at 10:32

## 2021-01-18 RX ADMIN — DOCUSATE SODIUM 100 MG: 100 CAPSULE, LIQUID FILLED ORAL at 09:21

## 2021-01-18 RX ADMIN — CEFAZOLIN SODIUM 2000 MG: 2 SOLUTION INTRAVENOUS at 09:30

## 2021-01-18 RX ADMIN — CEFAZOLIN SODIUM 2000 MG: 2 SOLUTION INTRAVENOUS at 02:00

## 2021-01-18 RX ADMIN — OXYCODONE HYDROCHLORIDE 5 MG: 5 TABLET ORAL at 09:22

## 2021-01-18 RX ADMIN — ACETAMINOPHEN 650 MG: 325 TABLET, FILM COATED ORAL at 00:00

## 2021-01-18 RX ADMIN — ACETAMINOPHEN 650 MG: 325 TABLET, FILM COATED ORAL at 11:27

## 2021-01-18 NOTE — UTILIZATION REVIEW
Initial Clinical Review    Admission: Date/Time/Statement:   Admission Orders (From admission, onward)     Ordered        01/16/21 1853  INPATIENT ADMISSION  Once                   Orders Placed This Encounter   Procedures    INPATIENT ADMISSION     Standing Status:   Standing     Number of Occurrences:   1     Order Specific Question:   Level of Care     Answer:   Med Surg [16]     Order Specific Question:   Estimated length of stay     Answer:   More than 2 Midnights     Order Specific Question:   Certification     Answer:   I certify that inpatient services are medically necessary for this patient for a duration of greater than two midnights  See H&P and MD Progress Notes for additional information about the patient's course of treatment  ED Arrival Information     Expected Arrival Acuity Means of Arrival Escorted By Service Admission Type    - 1/16/2021 15:43 Urgent Ambulance [facility] Orthopedic Surgery Urgent    Arrival Complaint    leg injury        Chief Complaint   Patient presents with    Leg Injury     Assessment/Plan:  25 y/o female presents to ED via EMS s/p twisting leg injury while skiing  C/o left lower leg pain and inability to bear weight  Pain is worse with motion or contact to the area and improves somewhat with rest    Imaging revealed a  a closed fracture left tib-fib with moderate displacement  Admit inpatient to M/S unit under Ortho service -- plan for OR   NPO, IVF's, analgesics  INSERTION NAIL IM TIBIA  Postoperative Note  Surgery Date: 1/17/2021  Procedure(s) (LRB):  INSERTION NAIL IM TIBIA (Left)   ORIF left tib-fib with IM nail   Anesthesia Type:   General    Findings:    8 x 315 statically locked nail    1/18 -- POD #1 L tibial IMN  Pain controlled Oxy 5 mg po x1 given  Reg diet  SCD's RLE  WBAT LLE in cam boot   PT/OT      ED Triage Vitals [01/16/21 1549]   Temperature Pulse Respirations Blood Pressure SpO2   98 4 °F (36 9 °C) 91 18 137/82 99 %      Temp Source Heart Rate Source Patient Position - Orthostatic VS BP Location FiO2 (%)   Oral Monitor Sitting Left arm --      Pain Score       7          Wt Readings from Last 1 Encounters:   No data found for Wt     Additional Vital Signs:   Date/Time  Temp  Pulse  Resp  BP  MAP (mmHg)  SpO2  O2 Flow Rate (L/min)  O2 Device  Patient Position - Orthostatic VS   01/18/21 11:01:43  98 5 °F (36 9 °C)  77  16  96/60  72  100 %  --  --  --   01/18/21 0800  --  --  --  --  --  --  --  None (Room air)  --   01/18/21 07:47:34  98 °F (36 7 °C)  88  16  96/66  76  96 %  --  --  --   01/18/21 02:35:01  98 4 °F (36 9 °C)  85  17  100/64  76  97 %  --  --  --   01/17/21 2313  98 1 °F (36 7 °C)  83  17  96/56  69  99 %  --  --  --   01/17/21 19:36:01  97 8 °F (36 6 °C)  71  17  113/70  84  99 %  --  --  --   01/17/21 1600  98 6 °F (37 °C)  71  16  108/71  --  98 %  --  --  --   01/17/21 15:09:08  98 6 °F (37 °C)  68  16  104/61  75  96 %  --  --  --   01/17/21 14:09:13  97 9 °F (36 6 °C)  73  16  106/63  77  95 %  --  None (Room air)  Lying   01/17/21 13:13:02  98 4 °F (36 9 °C)  75  18  108/55  73  98 %  --  None (Room air)  Lying   01/17/21 1245  98 °F (36 7 °C)  56  18  107/61  --  99 %  --  None (Room air)  --   01/17/21 1230  --  64  27Abnormal   113/50  --  100 %  --  None (Room air)  --   01/17/21 1215  --  72  30Abnormal   123/65  --  99 %  --  None (Room air)  --   01/17/21 1200  --  84  32Abnormal   121/76  --  99 %  --  None (Room air)  --   01/17/21 1145  --  82  30Abnormal   117/65  --  98 %  --  None (Room air)  --   01/17/21 1130  --  94  22  119/77  --  99 %  --  None (Room air)  --   01/17/21 1124  98 °F (36 7 °C)  95  14  114/54  --  100 %  6 L/min  Simple mask  --   01/17/21 08:05:57  98 6 °F (37 °C)  78  16  103/67  79  97 %  --  --  --   01/16/21 21:03:05  98 8 °F (37 1 °C)  86  --  98/64  75  96 %  --  --  --   01/16/21 1549  98 4 °F (36 9 °C)  91  18  137/82  --  99 %  --  None (Room air)         Pertinent Labs/Diagnostic Test Results:   XR L ankle 1/17 -- Comminuted and mildly displaced left distal tibial shaft fracture with a large butterfly fragment  XR L knee 1/17 -- Oblique comminuted fracture proximal fibular diaphysis with satisfactory approximation of the fragments   No additional fractures are found  XR L tib/fib 1/17 -- Comminuted left distal tibial shaft and proximal fibular shaft fractures  CXR 1/17 -- No acute cardiopulmonary disease  CT LLE 1/17 -- Comminuted fracture at the distal left tibia with medial angulation of distal fracture fragment   No evidence of posterior malleolar fracture         Results from last 7 days   Lab Units 01/16/21  1834   SARS-COV-2  Negative     Results from last 7 days   Lab Units 01/18/21  0549 01/16/21  1818   WBC Thousand/uL 10 94* 10 93*   HEMOGLOBIN g/dL 10 4* 13 3   HEMATOCRIT % 33 0* 41 2   PLATELETS Thousands/uL 191 251     Results from last 7 days   Lab Units 01/18/21  0549 01/16/21  1818   SODIUM mmol/L 141 138   POTASSIUM mmol/L 4 2 3 9   CHLORIDE mmol/L 111* 108   CO2 mmol/L 27 25   ANION GAP mmol/L 3* 5   BUN mg/dL 12 17   CREATININE mg/dL 0 86 0 83   EGFR ml/min/1 73sq m 90 94   CALCIUM mg/dL 8 8 9 4     Results from last 7 days   Lab Units 01/18/21  0549 01/16/21  1818   GLUCOSE RANDOM mg/dL 121 81     Results from last 7 days   Lab Units 01/16/21  1818   PROTIME seconds 12 8   INR  0 96   PTT seconds 28     Results from last 7 days   Lab Units 01/16/21  1834   INFLUENZA A PCR  Negative   INFLUENZA B PCR  Negative   RSV PCR  Negative     ED Treatment:   Medication Administration from 01/16/2021 1543 to 01/16/2021 2059       Date/Time Order Dose Route Action     01/16/2021 1607 HYDROmorphone (DILAUDID) injection 1 mg 1 mg Intravenous Given     01/16/2021 1607 ondansetron (ZOFRAN) injection 4 mg 4 mg Intravenous Given     01/16/2021 1709 fentanyl citrate (PF) 100 MCG/2ML 50 mcg 50 mcg Intravenous Given     01/16/2021 1827 HYDROmorphone (DILAUDID) injection 1 mg 1 mg Intravenous Given     History reviewed  No pertinent past medical history  Present on Admission:   Fracture of tibia and fibula, shaft, left, closed, initial encounter      Admitting Diagnosis: Leg injury [S89 90XA]  Closed fracture shaft fibula and tibia, left, initial encounter [S82 202A, S82 402A]  Age/Sex: 26 y o  female    Network Utilization Review Department  ATTENTION: Please call with any questions or concerns to [phone] and carefully listen to the prompts so that you are directed to the right person  All voicemails are confidential   Mat Bicker all requests for admission clinical reviews, approved or denied determinations and any other requests to dedicated fax number below belonging to the campus where the patient is receiving treatment   List of dedicated fax numbers for the Facilities:  [address] DENIALS (Administrative/Medical Necessity) [phone]   [address] (Maternity/NICU/Pediatrics) [phone] [address] Piedad Van 6279 (  Eun Rice "Loida" 103) [id number] [address] Cu[address] [place] [address] [phone]

## 2021-01-18 NOTE — CASE MANAGEMENT
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, works & drives  No dme  No h/o vna or rhb  Denies any MH,D&A tx  Uses [facility]  Emergency contact,  Thompson [phone]  Will have ride home  CM reviewed d/c planning process including the following: identifying help at home, patient preference for d/c planning needs, Discharge Lounge, [facility] Meds to Bed program, availability of treatment team to discuss questions or concerns patient and/or family may have regarding understanding medications and recognizing signs and symptoms once discharged  CM also encouraged patient to follow up with all recommended appointments after discharge  Patient advised of importance for patient and family to participate in managing patients medical well being

## 2021-01-18 NOTE — PHYSICAL THERAPY NOTE
PHYSICAL THERAPY EVALUATION  NAME:  Rina Rock  DATE: 01/18/21    AGE:   26 y o  Mrn:   [id number]  ADMIT DX:  Leg injury [S89 90XA]  Closed fracture shaft fibula and tibia, left, initial encounter [S82 202A, S82 402A]    History reviewed  No pertinent past medical history  Past Surgical History:   Procedure Laterality Date    NE TREAT TIBIAL SHAFT FX, INTRAMED IMPLANT Left 1/17/2021    Procedure: INSERTION NAIL IM TIBIA;  Surgeon: Castillo Chavez MD;  Location: [facility];  Service: Orthopedics       Length Of Stay: 2    PHYSICAL THERAPY EVALUATION:        01/18/21 0910   Note Type   Note type Evaluation   Pain Assessment   Pain Assessment Tool 0-10   Pain Score 5   Pain Location/Orientation Orientation: Left; Location: Leg   Pain Onset/Description Onset: Ongoing;Frequency: Constant/Continuous; Descriptor: Aching   Effect of Pain on Daily Activities increased pain with activity    Patient's Stated Pain Goal No pain   Hospital Pain Intervention(s) Ambulation/increased activity;Repositioned   Home Living   Type of [facility] One level  (0 ELIJAH )   Home Equipment Crutches   Additional Comments Patient reports living with spouse and father who are able to assist as needed   Prior Function   Level of [place] Independent with ADLs and functional mobility   Lives With Spouse; Family  (father )   Receives Help From   [address] in the last 6 months 1 to 4  (1;  reason for this admission )   Vocational Full time employment   Comments Patient denies use of an assistive device for ambulation prior to admission   Restrictions/Precautions   Weight Bearing Precautions Per Order Yes   LLE Weight Bearing Per Order WBAT  (in CAM boot )   Braces or Orthoses CAM Boot  (LLE )   Other Precautions Multiple lines; Fall Risk;Pain   General   Family/Caregiver Present No   Cognition   Overall Cognitive Status WFL   Arousal/Participation Alert   Orientation Level Oriented to person;Oriented to place;Oriented to time   Memory Within functional limits   Following Commands Follows all commands and directions without difficulty   RUE Assessment   RUE Assessment WFL   LUE Assessment   LUE Assessment WFL   RLE Assessment   RLE Assessment WFL   Strength RLE   RLE Overall Strength 4/5   LLE Assessment   LLE Assessment X  (LLE in CAM boot, hip and knee WFL )   Strength LLE   LLE Overall Strength 4-/5  (at hip and knee )   Bed Mobility   Supine to Sit 4  Minimal assistance   Additional items Assist x 1; Increased time required;Verbal cues   Transfers   Sit to Stand 4  Minimal assistance   Additional items Assist x 1; Increased time required;Verbal cues   Stand to Sit 4  Minimal assistance   Additional items Assist x 1; Increased time required;Verbal cues   Additional Comments Verbal cues and tactile cues needed for hand placement and safety   Ambulation/Elevation   Gait pattern Excessively slow; Short stride; Foward flexed; Inconsistent galilea   Gait Assistance 4  Minimal assist   Additional items Assist x 1   Assistive Device Rolling walker   Distance 35ft x 2    Balance   Static Sitting Fair   Static Standing Fair -   Ambulatory Poor +   Endurance Deficit   Endurance Deficit Yes   Endurance Deficit Description fatigue and pain    Activity Tolerance   Activity Tolerance Patient limited by fatigue;Patient limited by pain   Medical Staff Made Aware Ellen, OT; Nina Doctor, SPT    Nurse Made Aware Pt appropriate to be seen and mobilize per nsg    Assessment   Prognosis Good   Problem List Decreased strength;Decreased range of motion;Decreased endurance; Impaired balance;Decreased mobility;Pain;Orthopedic restrictions   Assessment Pt is 26 y o  female seen for PT evaluation s/p admit to [place] [address] on 1/16/2021  Two pt identifiers were used to confirm  Pt presented w/  Twisting leg injury while skiing at [facility]  Pt was admitted with a primary dx of: L tibial shaft fx   Pt underwent INSERTION NAIL IM TIBIA (Left)  which was performed on 1/17/21  PT now consulted for assessment of mobility and d/c needs  Pt with Activity as tolerated orders  Pts current co morbidities affecting treatment include: no past medical hx on file and personal factors including [place]  Pts current clinical presentation is Unstable/ Unpredictable (high complexity) due to Ongoing medical management for primary dx, Increased reliance on more restrictive AD compared to baseline, Decreased activity tolerance compared to baseline, Fall risk, Increased assistance needed from caregiver at current time, Continuous pulse oximetry monitoring , s/p surgical intervention    Prior to admission, pt was I with ambulation without the use of an AD as per pt  Upon evaluation, pt currently is requiring Min Ax1 for bed mobility; Min Ax1 for transfers and Min Ax1 for ambulation w/ RW   Pt denies any light headedness or dizziness with ambulation  Pt presents at PT eval functioning below baseline and currently w/ overall mobility deficits 2* to: LLE weakness, decreased ROM, impaired balance, decreased endurance, gait deviations, pain, decreased activity tolerance compared to baseline, fall risk, orthopedic restrictions  Pt currently at a fall risk 2* to impairments listed above  Based on the aforementioned PT evaluation, pt will continue to benefit from skilled Acute PT interventions to address stated impairments; to maximize functional mobility; for ongoing pt/ family training; and DME needs  At conclusion of PT session pt returned back in chair with phone and call bell within reach  Pt denies any further questions at this time  PT is currently recommending Home with increased family support and Outpatient PT  Pt agreeable to plan and goals as stated on evaluation  PT will continue to follow during hospital stay     Barriers to Discharge None   Barriers to Discharge Comments Pt denies any mobility or safety concerns about returning home at d/c    Goals   Patient Goals " to go home"   STG Expiration Date 01/28/21   Short Term Goal #1 In 10 days pt will complete: 1) Bed mobility skills with mod I to increase safety and independence as well as decrease caregiver burden  2) Functional transfers with mod I to promote increased independence, safety, and QOL  3) Ambulate 150' using least restrictive AD with mod I without LOB and stable vitals so that pt can negotiate previous living environment safely and promote independence with functional mobility and return to PLOF  4) Improve balance grades by 1/2 grade to increase safety with all mobility and decrease fall risk  5) Improve BLE strength by 1/2 grade to help increase overall functional mobility and decrease fall risk  Plan   Treatment/Interventions Functional transfer training;LE strengthening/ROM; Therapeutic exercise; Endurance training;Patient/family training;Equipment eval/education; Bed mobility;Gait training;Spoke to nursing;OT   PT Frequency Other (Comment)  (3-6x a week)   Recommendation   PT Discharge Recommendation Return to previous environment with social support;Home with skilled therapy  (home with increased support, OPPT )   Equipment Recommended Walker  (RW)   PT - OK to Discharge Yes  (When medically cleared )   Modified [place] Scale   Modified [place] Scale 4   Barthel Index   Feeding 10   Bathing 5   Grooming Score 5   Dressing Score 5   Bladder Score 10   Bowels Score 10   Toilet Use Score 10   Transfers (Bed/Chair) Score 10   Mobility (Level Surface) Score 0   Stairs Score 0   Barthel Index Score 65   Portions of the documentation may have been created using voice recognition software  Occasional wrong word or sound alike substitutions may have occurred due to the inherent limitations of the voice recognition software  Read the chart carefully and recognize, using context, where substitutions have occurred      Hardy Gramajo, PT, DPT

## 2021-01-18 NOTE — CASE MANAGEMENT
Pt discussed during care coordination rounds  Recommended OP therapy  List given to pt  Needs rw & commode  Pt has rw at home & does not want commode  Informed by orhto they sent script for lovenox to pt's [facility] pharmacy  TC to them & they do not have rx coverage info  Cost $979  S/w pt & insurance changed as of 1/1/21  Rx coverage ID [id number] Gro [id number] Bin [id number] PCN [id number]  [facility] & [facility]ay now $30  Pt agreeable  Has ride home    Updated RN

## 2021-01-18 NOTE — ORTHOTIC NOTE
Orthotic Note            Date: 1/18/2021      Patient Name: [facility]            Reason for Consult:  Patient Active Problem List   Diagnosis    Fracture of tibia and fibula, shaft, left, closed, initial encounter    Vitamin D deficiency     Medium Tall Breg Cam Boot    With assistance from [facility], fit and don pt in a Medium Tall Breg Cam Boot to LLE  Instructions and adjustments reviewed with pt, pt denies any questions  RN aware, Jakob Stark will continue to follow  Recommendations:  Please call Mobility Coordinator at ext  [phone] in regards to bracing instruction and/or adjustment    Violetta Lauren Restorative Technician, BS

## 2021-01-18 NOTE — PLAN OF CARE
Problem: Potential for Falls  Goal: Patient will remain free of falls  Description: INTERVENTIONS:  - Assess patient frequently for physical needs  -  Identify cognitive and physical deficits and behaviors that affect risk of falls    -  [place] fall precautions as indicated by assessment   - Educate patient/family on patient safety including physical limitations  - Instruct patient to call for assistance with activity based on assessment  - Modify environment to reduce risk of injury  - Consider OT/PT consult to assist with strengthening/mobility  Outcome: Adequate for Discharge     Problem: Prexisting or High Potential for Compromised Skin Integrity  Goal: Skin integrity is maintained or improved  Description: INTERVENTIONS:  - Identify patients at risk for skin breakdown  - Assess and monitor skin integrity  - Assess and monitor nutrition and hydration status  - Monitor labs   - Assess for incontinence   - Turn and reposition patient  - Assist with mobility/ambulation  - Relieve pressure over bony prominences  - Avoid friction and shearing  - Provide appropriate hygiene as needed including keeping skin clean and dry  - Evaluate need for skin moisturizer/barrier cream  - Collaborate with interdisciplinary team   - Patient/family teaching  - Consider wound care consult   Outcome: Adequate for Discharge

## 2021-01-18 NOTE — OCCUPATIONAL THERAPY NOTE
Occupational Therapy Evaluation     Patient Name: Nirmala Beavers  SQAVM'K Date: 1/18/2021  Problem List  Principal Problem:    Status post left tibia IM nail insertion   Active Problems:    Fracture of tibia and fibula, shaft, left, closed, initial encounter    Vitamin D deficiency    Past Medical History  History reviewed  No pertinent past medical history  Past Surgical History  Past Surgical History:   Procedure Laterality Date    DC TREAT TIBIAL SHAFT FX, INTRAMED IMPLANT Left 1/17/2021    Procedure: INSERTION NAIL IM TIBIA;  Surgeon: Shad Enciso MD;  Location: [facility];  Service: Orthopedics           01/18/21 0911   OT Last Visit   OT Visit Date 01/18/21   Note Type   Note type Evaluation   Restrictions/Precautions   Weight Bearing Precautions Per Order Yes   LLE Weight Bearing Per Order WBAT  (IN CAM BOOT)   Braces or Orthoses CAM Boot   Other Precautions WBS; Fall Risk;Pain   Pain Assessment   Pain Assessment Tool 0-10   Pain Score 5   Pain Location/Orientation Orientation: Left; Location: Knee   Patient's Stated Pain Goal No pain   Hospital Pain Intervention(s) Repositioned; Ambulation/increased activity; Emotional support;Elevated   Home Living   Type of [address] [place] [address] One level; Work area in basement  (0 ELIJAH )   Bathroom Shower/Tub Tub/shower unit  (WALK-IN IN [employer] )   Bathroom Toilet Standard   Bathroom Equipment Grab bars in shower;Grab bars around toilet   [address]   Additional Comments NO USE OF DME AT BASELINE    Prior Function   Level of [place] Independent with ADLs and functional mobility   Lives With [place] Help From Family   ADL Assistance Independent   IADLs Independent   Falls in the last 6 months 1 to 4  (0- [address] )   Vocational Full time employment   Lifestyle   Autonomy PT REPORTS BEING INDEPENDENT WITH ADLS/IADLS/DRIVING PTA    Reciprocal Relationships LIVES WITH SUPPORTIVE SPOUSE  PT'S FATHER PLANS TO STAY WITH PT UPON D/C TO ASSIST AS NEEDED    Service to Others WORKS FULL TIME IN [address]    Intrinsic Gratification ENJOYS SPENDING TIME WITH HER NEW DOG    Psychosocial   Psychosocial (WDL) WDL   ADL   Eating Assistance 7  Independent   Grooming Assistance 7  Independent   UB Bathing Assistance 7  Independent   LB Bathing Assistance 4  Minimal Assistance   UB Dressing Assistance 7  Independent   LB Dressing Assistance 4  Minimal Assistance   Toileting Assistance  4  Minimal [address] 4  Minimal Assistance   Bed Mobility   Supine to Sit 4  Minimal assistance   Additional items Assist x 1; Increased time required;Verbal cues;LE management   Sit to Supine Unable to assess  (PT LEFT OOB WITH ALL NEEDS IN REACH )   Transfers   Sit to Stand 4  Minimal assistance   Additional items Assist x 1; Increased time required;Verbal cues   Stand to Sit 4  Minimal assistance   Additional items Assist x 1; Increased time required;Verbal cues   Toilet transfer 4  Minimal assistance   Additional items Assist x 1; Increased time required;Verbal cues;Standard toilet   Functional Mobility   Functional Mobility 4  Minimal assistance   Additional items Rolling walker   Balance   Static Sitting Fair +   Static Standing Fair -   Ambulatory Poor +   Activity Tolerance   Activity Tolerance Patient tolerated treatment well;Patient limited by pain   Medical Staff Made Aware KAILASH CLINE/MATTEO  CM    Nurse Made Aware APPROPRIATE TO SEE PER RN    RUE Assessment   RUE Assessment WFL   LUE Assessment   LUE Assessment WFL   Hand Function   Gross Motor Coordination Functional   Fine Motor Coordination Functional   Sensation   Light Touch No apparent deficits   Cognition   Overall Cognitive Status WFL   Arousal/Participation Alert; Cooperative   Attention Within functional limits   Orientation Level Oriented X4   Memory Within functional limits   Following Commands Follows all commands and directions without difficulty   Comments PT IS PLEASANT AND COOPERATIVE  Assessment   Assessment 27 YO Female SEEN FOR INITIAL OCCUPATIONAL THERAPY EVALUATION FOLLOWING ADMISSION TO [facility] S/P FALL WHILE SKIING RESULTING IN L TIBIAL SHAFT FX  PT IS S/P INSERTION OF L TIBIAL IMN ON 1/17/21  PT IS WBAT ON LLE IN CAM BOOT  PT IS FROM HOME WITH SUPPORTIVE WHERE SHE REPORTS BEING INDEPENDENT WITH ADLS/IADLS/DRIVING PTA  PT CURRENTLY REQUIRES OVERALL MIN A WITH ADLS, TRANSFERS AND FUNCTIONAL MOBILITY WITH USE OF RW  PT IS EXPERIENCING EXPECTED LIMITATIONS 2' PAIN, FATIGUE, IMPAIRED BALANCE, WB RESTRICTIONS, ORTHOPEDIC RESTRICTIONS and OVERALL LIMITED ACTIVITY TOLERANCE  PT EDUCATED ON CARRY OVER OF WB STATUS, DEEP BREATHING TECHNIQUES T/O ACTIVITY, SLOWING OF PACE, ENERGY CONSERVATION TECHNIQUES FOR CARRY OVER UPON D/C, INCREASED FAMILY SUPPORT and CONTINUE PARTICIPATION IN SELF-CARE/MOBILITY WITH STAFF [address] [facility]   PT REPORTS HER FATHER PLANS TO STAY WITH HER UPON D/C IN ORDER TO ASSIST AS NEEDED  FROM AN OCCUPATIONAL THERAPY PERSPECTIVE, PT CAN RETURN HOME WITH INCREASED FAMILY SUPPORT WHEN MEDICALLY CLEARED  DME RECS INCLUDE BSC/SC AND AGREE WITH USE OF RW  ALL QUESTIONS/CONCERNS ADDRESSED  NO ADDITIONAL ACUTE CARE OT NEEDS  D/C OT      Goals   Patient Goals TO RETURN HOME    Recommendation   OT Discharge Recommendation Return to previous environment with social support   Equipment Recommended Bedside commode  ([id number] [facility],  )   OT - OK to Discharge Yes   Modified Sharmila Scale   Modified Sharmila Scale 3       Documentation completed by DEYSI Pena, OTR/L

## 2021-01-18 NOTE — DISCHARGE SUMMARY
Discharge Summary - Orthopedics   Rina Rock 26 y o  female MRN: [id number]  Unit/Bed#: [facility] 620-01    Attending Physician: Alaina Lassiter    Admitting diagnosis: Leg injury [S89 90XA]  Closed fracture shaft fibula and tibia, left, initial encounter Matt Ochoa, S82 [id number]]    Discharge diagnosis: Status post left tibia IM nail insertion    Date of admission: 1/16/2021    Date of discharge: 01/18/21    Procedure: Left tibial IM nail insertion      HPI & Hospital course:  26 y o  female who presented to the ED after a twisting injury while skiing sustaining a left tibia and fibula fracture  Pt was admitted to the orthopaedic service and taken to the OR on 1/17/2021 for insertion of left tibia IM nail  Prior to surgery the risks and benefits of surgery were explained and informed consent was obtained  Surgery went without complications and pt was discharged to the PACU in a stable condition and was transferred to the floor  Lovenox was started for DVT ppx 12hrs postoperatively  On discharge date pt was cleared by PT and the medicine team and determined to be safe for discharge  Daily discussion was had with the patient, nursing staff, orthopaedic team, and family members if present  All questions were answered to the patients satisifaction  0   Lab Value Date/Time    HGB 10 4 (L) 01/18/2021 0549    HGB 13 3 01/16/2021 1818       Greater than 2 gram decrease in Hb qualifies for diagnosis of acute blood loss anemia  Vital signs remained stable and pt was resuscitated with IVF as needed  Discharge Instructions:   · Weight bearing as tolerated left lower extremity in CAM boot   · Keep dressings clean and dry at all times   · Complete DVT prophylaxis as prescribed   · Physical therapy  · Follow-up as scheduled, otherwise call for appt  Discharge Medications: For the complete list of discharge medications, please refer to the patient's medication reconciliation

## 2021-01-18 NOTE — PROGRESS NOTES
Progress Note - Orthopedics   Rina Rock 26 y o  female MRN: [id number]  Unit/Bed#: [facility] 620-01      Subjective:    26 y  o female POD 1 left tibial IMN  No acute events, no complaints  Pt doing well  Pain controlled  Denies fevers chills, CP, SOB    Labs:  0   Lab Value Date/Time    HCT 41 2 01/16/2021 1818    HGB 13 3 01/16/2021 1818    INR 0 96 01/16/2021 1818    WBC 10 93 (H) 01/16/2021 1818       Meds:    Current Facility-Administered Medications:     acetaminophen (TYLENOL) tablet 650 mg, 650 mg, Oral, Q6H [facility], Hoang Belle MD, 650 mg at 01/18/21 0604    calcium carbonate (TUMS) chewable tablet 1,000 mg, 1,000 mg, Oral, Daily PRN, Hoang Belle MD    ceFAZolin (ANCEF) IVPB (premix in dextrose) 2,000 mg 50 mL, 2,000 mg, Intravenous, Q8H, Hoang Belle MD, Last Rate: 100 mL/hr at 01/18/21 0200, 2,000 mg at 01/18/21 0200    docusate sodium (COLACE) capsule 100 mg, 100 mg, Oral, BID, Hoang Belle MD, 100 mg at 01/17/21 1759    enoxaparin (LOVENOX) subcutaneous injection 40 mg, 40 mg, Subcutaneous, Daily, Hoang Belle MD    HYDROmorphone (DILAUDID) injection 0 5 mg, 0 5 mg, Intravenous, Q3H PRN, Hoang Belle MD    ondansetron [facility]) injection 4 mg, 4 mg, Intravenous, Q4H PRN, Katarzyna Nix PA-C, 4 mg at 01/17/21 1944    oxyCODONE (ROXICODONE) immediate release tablet 10 mg, 10 mg, Oral, Q4H PRN, Hoang Belle MD, 10 mg at 01/17/21 1944    oxyCODONE (ROXICODONE) IR tablet 5 mg, 5 mg, Oral, Q4H PRN, Hoang Belle MD    senna (SENOKOT) tablet 8 6 mg, 1 tablet, Oral, HS, Hoang Belle MD, 8 6 mg at 01/17/21 4857    Blood Culture:   No results found for: BLOODCX    Wound Culture:   No results found for: WOUNDCULT    Ins and Outs:  I/O last 24 hours: In: 0203 [P O :240;  I V :1300; IV Piggyback:250]  Out: 5542 [Urine:2405]          Physical:  Vitals:    01/18/21 0235   BP: 100/64   Pulse: 85   Resp: 17   Temp: 98 4 °F (36 9 °C)   SpO2: 97%     Musculoskeletal: left Lower Extremity  · Dressing with strikethrough  · TTP left incision  · SILT s/s/sp/dp/t  +fhl/ehl, +ankle dorsi/plantar flexion  2+ DP pulse    Assessment:    26 y  o female POD 1 L tibial IMN  Patient doing well      Plan:  · WBAT LLE in cam boot  · Continue to monitor for acute blood loss anemia, will monitor and administer IVF/prbc as indicated   · PT/OT  · Pain control  · DVT ppx  · Dispo: Ortho will follow    Eliu Valadez MD

## 2021-01-18 NOTE — UTILIZATION REVIEW
Initial Clinical Review    Admission: Date/Time/Statement:   Admission Orders (From admission, onward)     Ordered        01/16/21 1853  INPATIENT ADMISSION  Once                   Orders Placed This Encounter   Procedures    INPATIENT ADMISSION     Standing Status:   Standing     Number of Occurrences:   1     Order Specific Question:   Level of Care     Answer:   Med Surg [16]     Order Specific Question:   Estimated length of stay     Answer:   More than 2 Midnights     Order Specific Question:   Certification     Answer:   I certify that inpatient services are medically necessary for this patient for a duration of greater than two midnights  See H&P and MD Progress Notes for additional information about the patient's course of treatment  ED Arrival Information     Expected Arrival Acuity Means of Arrival Escorted By Service Admission Type    - 1/16/2021 15:43 [facility] [address] [facility]    Arrival Complaint    leg injury        Chief Complaint   Patient presents with    Leg Injury     Assessment/Plan:   30y Female to ED presents with Skiing accident with left leg pain and inability to bear wt  She twisted her leg while skiing in [facility]  Admit Inpatient level of care for S/p twisting injury while skiing with left tibial shaft fracture  Placed in a long leg splint with stirrups  NWB LLE in splint  Analgesics for pain  NPO at midnight  Plan OR for operative fixation of tibial shaft fracture  Check Vit D levels       1/17 OR - S/P INSERTION NAIL IM TIBIA (Left)   ORIF left tib-fib with IM nail    ED Triage Vitals [01/16/21 1549]   Temperature Pulse Respirations Blood Pressure SpO2   98 4 °F (36 9 °C) 91 18 137/82 99 %      Temp Source Heart Rate Source Patient Position - Orthostatic VS BP Location FiO2 (%)   Oral Monitor Sitting Left arm --      Pain Score       7          Wt Readings from Last 1 Encounters:   No data found for Wt     Additional Vital Signs:   01/17/21 1124  98 °F (36 7 °C)  95  14  114/54  --  100 %  6 L/min  Simple mask  --   01/17/21 08:05:57  98 6 °F (37 °C)  78  16  103/67  79  97 %  --  --  --   01/16/21 21:03:05  98 8 °F (37 1 °C)  86  --  98/64  75  96 %  --  --  --   01/16/21 1549  98 4 °F (36 9 °C)  91  18  137/82  --  99 %  --  None (Room air)       Pertinent Labs/Diagnostic Test Results:   1/16   Xray Left Ankle - Comminuted and mildly displaced left distal tibial shaft fracture with a large butterfly fragment  Xray Left Knee - Oblique comminuted fracture proximal fibular diaphysis with satisfactory approximation of the fragments  No additional fractures are found  Xray Left Tibia/Fibula - Comminuted left distal tibial shaft and proximal fibular shaft fractures  PCXR - No acute cardiopulmonary disease  CT Left lower extremity - Comminuted fracture at the distal left tibia with medial angulation of distal fracture fragment    No evidence of posterior malleolar fracture        Results from last 7 days   Lab Units 01/16/21  1834   SARS-COV-2  Negative     Results from last 7 days   Lab Units 01/16/21  1818   WBC Thousand/uL 10 93*   HEMOGLOBIN g/dL 13 3   HEMATOCRIT % 41 2   PLATELETS Thousands/uL 251         Results from last 7 days   Lab Units 01/16/21  1818   SODIUM mmol/L 138   POTASSIUM mmol/L 3 9   CHLORIDE mmol/L 108   CO2 mmol/L 25   ANION GAP mmol/L 5   BUN mg/dL 17   CREATININE mg/dL 0 83   EGFR ml/min/1 73sq m 94   CALCIUM mg/dL 9 4             Results from last 7 days   Lab Units 01/16/21  1818   GLUCOSE RANDOM mg/dL 81       Results from last 7 days   Lab Units 01/16/21  1818   PROTIME seconds 12 8   INR  0 96   PTT seconds 28     Results from last 7 days   Lab Units 01/16/21  1834   INFLUENZA A PCR  Negative   INFLUENZA B PCR  Negative   RSV PCR  Negative       ED Treatment:   Medication Administration from 01/16/2021 1543 to 01/16/2021 2059       Date/Time Order Dose Route Action Comments     01/16/2021 1607 HYDROmorphone (DILAUDID) injection 1 mg 1 mg Intravenous Given 0 5 mg given at a time - 5 minutes apart to monitor BP and tolerance per Dr Kaylyn Shoemaker at bedside  01/16/2021 1607 ondansetron (ZOFRAN) injection 4 mg 4 mg Intravenous Given      01/16/2021 1709 fentanyl citrate (PF) 100 MCG/2ML 50 mcg 50 mcg Intravenous Given      01/16/2021 1827 HYDROmorphone (DILAUDID) injection 1 mg 1 mg Intravenous Given         History reviewed  No pertinent past medical history    Present on Admission:   Fracture of tibia and fibula, shaft, left, closed, initial encounter      Admitting Diagnosis: Leg injury [S89 90XA]  Closed fracture shaft fibula and tibia, left, initial encounter [L03 291K, S82 402A]  Age/Sex: 26 y o  female     Admission Orders:  Scheduled Medications:  acetaminophen (TYLENOL) tablet 650 mg   Dose: 650 mg  Freq: Every 6 hours scheduled Route: PO  Start: 01/17/21 1345     ceFAZolin (ANCEF) IVPB (premix in dextrose) 2,000 mg 50 mL   Dose: 2,000 mg  Freq: Every 8 hours Route: IV  Start: 01/17/21 1830    Continuous IV Infusions:  lactated ringers infusion   Rate: 75 mL/hr Dose: 75 mL/hr  Freq: Continuous Route: IV  Last Dose: Stopped (01/17/21 1358)  Start: 01/17/21 0000    PRN Meds:  HYDROmorphone (DILAUDID) injection 0 5 mg   Dose: 0 5 mg  Freq: Every 5 minutes PRN Route: IV 1/17 x4  PRN Reason: severe pain  Indications of Use: MODERATE TO SEVERE PAIN  Start: 01/17/21 1202 End: 01/17/21 1233    HYDROmorphone (DILAUDID) injection 0 5 mg   Dose: 0 5 mg  Freq: Every 3 hours PRN Route: IV 1/16 x1, 1/17 x1  PRN Reason: breakthrough pain    ondansetron (ZOFRAN) injection 4 mg   Dose: 4 mg  Freq: Every 4 hours PRN Route: IV 1/17 x2  PRN Reason: nausea  Start: 01/17/21 1440 End: 01/18/21 1438    oxyCODONE (ROXICODONE) immediate release tablet 10 mg   Dose: 10 mg  Freq: Every 4 hours PRN Route: PO 17 x1  PRN Reason: severe pain  Start: 01/17/21 1336 End: 01/18/21 1438    Regular Post op diet   Weight Bearing restrictions  Neurovascular checks q4h  SCD/ Foot pump    Network Utilization Review Department  ATTENTION: Please call with any questions or concerns to [phone] and carefully listen to the prompts so that you are directed to the right person  All voicemails are confidential   Genie Ocampo all requests for admission clinical reviews, approved or denied determinations and any other requests to dedicated fax number below belonging to the campus where the patient is receiving treatment   List of dedicated fax numbers for the Facilities:  [address] DENIALS (Administrative/Medical Necessity) [phone]   [address] (Maternity/NICU/Pediatrics) [phone] [address] [place] [address] Dr Piedad Van 3416 (  Eun Rice "Loida" 103) [address] [place] [address] Deric Cuellar 14[address] [place] [address] [phone]

## 2021-01-18 NOTE — PLAN OF CARE
Problem: PHYSICAL THERAPY ADULT  Goal: Performs mobility at highest level of function for planned discharge setting  See evaluation for individualized goals  Description: Treatment/Interventions: Functional transfer training, LE strengthening/ROM, Therapeutic exercise, Endurance training, Patient/family training, Equipment eval/education, Bed mobility, Gait training, Spoke to nursing, OT  Equipment Recommended: Walker(RW)       See flowsheet documentation for full assessment, interventions and recommendations  Note: Prognosis: Good  Problem List: Decreased strength, Decreased range of motion, Decreased endurance, Impaired balance, Decreased mobility, Pain, Orthopedic restrictions  Assessment: Pt is 26 y o  female seen for PT evaluation s/p admit to [facility] on 1/16/2021  Two pt identifiers were used to confirm  Pt presented w/  Twisting leg injury while skiing at [facility]  Pt was admitted with a primary dx of: L tibial shaft fx  Pt underwent INSERTION NAIL IM TIBIA (Left)  which was performed on 1/17/21  PT now consulted for assessment of mobility and d/c needs  Pt with Activity as tolerated orders  Pts current co morbidities affecting treatment include: no past medical hx on file and personal factors including working full time in [facility] prior to this accident  Pts current clinical presentation is Unstable/ Unpredictable (high complexity) due to Ongoing medical management for primary dx, Increased reliance on more restrictive AD compared to baseline, Decreased activity tolerance compared to baseline, Fall risk, Increased assistance needed from caregiver at current time, Continuous pulse oximetry monitoring , s/p surgical intervention    Prior to admission, pt was I with ambulation without the use of an AD as per pt  Upon evaluation, pt currently is requiring Min Ax1 for bed mobility; Min Ax1 for transfers and Min Ax1 for ambulation w/ RW    Pt denies any light headedness or dizziness with ambulation  Pt presents at PT eval functioning below baseline and currently w/ overall mobility deficits 2* to: LLE weakness, decreased ROM, impaired balance, decreased endurance, gait deviations, pain, decreased activity tolerance compared to baseline, fall risk, orthopedic restrictions  Pt currently at a fall risk 2* to impairments listed above  Based on the aforementioned PT evaluation, pt will continue to benefit from skilled Acute PT interventions to address stated impairments; to maximize functional mobility; for ongoing pt/ family training; and DME needs  At conclusion of PT session pt returned back in chair with phone and call bell within reach  Pt denies any further questions at this time  PT is currently recommending Home with increased family support and Outpatient PT  Pt agreeable to plan and goals as stated on evaluation  PT will continue to follow during hospital stay  Barriers to Discharge: None  Barriers to Discharge Comments: Pt denies any mobility or safety concerns about returning home at d/c   PT Discharge Recommendation: Return to previous environment with social support, Home with skilled therapy(home with increased support, OPPT )     PT - OK to Discharge: Yes(When medically cleared )    See flowsheet documentation for full assessment

## 2021-01-20 ENCOUNTER — TELEPHONE (OUTPATIENT)
Dept: OBGYN CLINIC | Facility: HOSPITAL | Age: 31
End: 2021-01-20

## 2021-01-20 LAB
ATRIAL RATE: 85 BPM
P AXIS: 77 DEGREES
PR INTERVAL: 136 MS
QRS AXIS: 85 DEGREES
QRSD INTERVAL: 80 MS
QT INTERVAL: 394 MS
QTC INTERVAL: 468 MS
T WAVE AXIS: 65 DEGREES
VENTRICULAR RATE: 85 BPM

## 2021-01-20 PROCEDURE — 93010 ELECTROCARDIOGRAM REPORT: CPT | Performed by: INTERNAL MEDICINE

## 2021-01-20 NOTE — TELEPHONE ENCOUNTER
Patient given above information and verbalized understanding  Patient also states that she is now noticing her R knee is causing pain when she bends it  Denies redness or swelling  Advised that she should monitor this for increasing symptoms  Use RICE method and call office should this not improve  Patient verbalized understanding

## 2021-01-20 NOTE — TELEPHONE ENCOUNTER
She may come out of the boot to rest and elevate the leg and apply ice      She has a tibial nail    The boot is being used to keep the foot and ankle dorsiflexed,   And provides a splint    DRR

## 2021-01-20 NOTE — TELEPHONE ENCOUNTER
PCM call placed for patient Post op  R LE ORIF - Patient states that she is having increased pain and not able to get comfortable in any position  She feels pressure on the bottom of her foot and numbness above the cuticle area to lateral side of Great toe  She has been taking the oxycodone 5mg 2 tabs every 4-6 hrs as 1 tab does not cut it  Advised she should go back to 1 tab every 4 hrs and take tylenol 1000mg TID in between  Elevation above the nose  Is patient able to come out of boot to ice the area  Please advise

## 2021-01-21 ENCOUNTER — OFFICE VISIT (OUTPATIENT)
Dept: OBGYN CLINIC | Facility: HOSPITAL | Age: 31
End: 2021-01-21
Payer: COMMERCIAL

## 2021-01-21 ENCOUNTER — HOSPITAL ENCOUNTER (OUTPATIENT)
Dept: RADIOLOGY | Facility: HOSPITAL | Age: 31
Discharge: HOME | End: 2021-01-21
Attending: ORTHOPAEDIC SURGERY
Payer: COMMERCIAL

## 2021-01-21 VITALS — DIASTOLIC BLOOD PRESSURE: 67 MMHG | HEART RATE: 94 BPM | SYSTOLIC BLOOD PRESSURE: 102 MMHG

## 2021-01-21 DIAGNOSIS — S83.412A SPRAIN OF MEDIAL COLLATERAL LIGAMENT OF LEFT KNEE, INITIAL ENCOUNTER: ICD-10-CM

## 2021-01-21 DIAGNOSIS — S82.402A FRACTURE OF TIBIA AND FIBULA, SHAFT, LEFT, CLOSED, INITIAL ENCOUNTER: Primary | ICD-10-CM

## 2021-01-21 DIAGNOSIS — Z98.890 STATUS POST OPEN REDUCTION AND INTERNAL FIXATION (ORIF) OF FRACTURE: ICD-10-CM

## 2021-01-21 DIAGNOSIS — S82.402A FRACTURE OF TIBIA AND FIBULA, SHAFT, LEFT, CLOSED, INITIAL ENCOUNTER: ICD-10-CM

## 2021-01-21 DIAGNOSIS — Z87.81 STATUS POST OPEN REDUCTION AND INTERNAL FIXATION (ORIF) OF FRACTURE: ICD-10-CM

## 2021-01-21 DIAGNOSIS — S82.202A FRACTURE OF TIBIA AND FIBULA, SHAFT, LEFT, CLOSED, INITIAL ENCOUNTER: ICD-10-CM

## 2021-01-21 DIAGNOSIS — S82.202A FRACTURE OF TIBIA AND FIBULA, SHAFT, LEFT, CLOSED, INITIAL ENCOUNTER: Primary | ICD-10-CM

## 2021-01-21 PROCEDURE — 99213 OFFICE O/P EST LOW 20 MIN: CPT | Performed by: ORTHOPAEDIC SURGERY

## 2021-01-21 PROCEDURE — 73560 X-RAY EXAM OF KNEE 1 OR 2: CPT

## 2021-01-21 PROCEDURE — 73590 X-RAY EXAM OF LOWER LEG: CPT

## 2021-01-21 RX ORDER — NABUMETONE 750 MG/1
750 TABLET, FILM COATED ORAL 2 TIMES DAILY
Qty: 30 TABLET | Refills: 0 | Status: SHIPPED | OUTPATIENT
Start: 2021-01-21

## 2021-01-21 RX ORDER — METHOCARBAMOL 500 MG/1
500 TABLET, FILM COATED ORAL 3 TIMES DAILY
Qty: 30 TABLET | Refills: 0 | Status: SHIPPED | OUTPATIENT
Start: 2021-01-21

## 2021-01-21 NOTE — UTILIZATION REVIEW
Initial Clinical Review    Admission: Date/Time/Statement:   Admission Orders (From admission, onward)     Ordered        01/16/21 1853  INPATIENT ADMISSION  Once                   Orders Placed This Encounter   Procedures    INPATIENT ADMISSION     Standing Status:   Standing     Number of Occurrences:   1     Order Specific Question:   Level of Care     Answer:   Med Surg [16]     Order Specific Question:   Estimated length of stay     Answer:   More than 2 Midnights     Order Specific Question:   Certification     Answer:   I certify that inpatient services are medically necessary for this patient for a duration of greater than two midnights  See H&P and MD Progress Notes for additional information about the patient's course of treatment  ED Arrival Information     Expected Arrival Acuity Means of Arrival Escorted By Service Admission Type    - 1/16/2021 15:43 Urgent Ambulance [facility] Orthopedic Surgery Urgent    Arrival Complaint    leg injury        Chief Complaint   Patient presents with    Leg Injury     Assessment/Plan:   30y Female to ED presents with Skiing accident with left leg pain and inability to bear wt  She twisted her leg while skiing in [facility]  Admit Inpatient level of care for S/p twisting injury while skiing with left tibial shaft fracture  Placed in a long leg splint with stirrups  NWB LLE in splint  Analgesics for pain  NPO at midnight  Plan OR for operative fixation of tibial shaft fracture  Check Vit D levels       1/17 OR - S/P INSERTION NAIL IM TIBIA (Left)   ORIF left tib-fib with IM nail    ED Triage Vitals [01/16/21 1549]   Temperature Pulse Respirations Blood Pressure SpO2   98 4 °F (36 9 °C) 91 18 137/82 99 %      Temp Source Heart Rate Source Patient Position - Orthostatic VS BP Location FiO2 (%)   Oral Monitor Sitting Left arm --      Pain Score       7          Wt Readings from Last 1 Encounters:   No data found for Wt     Additional Vital Signs:   01/17/21 1124  98 °F (36 7 °C)  95  14  114/54  --  100 %  6 L/min  Simple mask  --   01/17/21 08:05:57  98 6 °F (37 °C)  78  16  103/67  79  97 %  --  --  --   01/16/21 21:03:05  98 8 °F (37 1 °C)  86  --  98/64  75  96 %  --  --  --   01/16/21 1549  98 4 °F (36 9 °C)  91  18  137/82  --  99 %  --  None (Room air)       Pertinent Labs/Diagnostic Test Results:   1/16   Xray Left Ankle - Comminuted and mildly displaced left distal tibial shaft fracture with a large butterfly fragment  Xray Left Knee - Oblique comminuted fracture proximal fibular diaphysis with satisfactory approximation of the fragments  No additional fractures are found  Xray Left Tibia/Fibula - Comminuted left distal tibial shaft and proximal fibular shaft fractures  PCXR - No acute cardiopulmonary disease  CT Left lower extremity - Comminuted fracture at the distal left tibia with medial angulation of distal fracture fragment    No evidence of posterior malleolar fracture        Results from last 7 days   Lab Units 01/16/21  1834   SARS-COV-2  Negative     Results from last 7 days   Lab Units 01/16/21  1818   WBC Thousand/uL 10 93*   HEMOGLOBIN g/dL 13 3   HEMATOCRIT % 41 2   PLATELETS Thousands/uL 251         Results from last 7 days   Lab Units 01/16/21  1818   SODIUM mmol/L 138   POTASSIUM mmol/L 3 9   CHLORIDE mmol/L 108   CO2 mmol/L 25   ANION GAP mmol/L 5   BUN mg/dL 17   CREATININE mg/dL 0 83   EGFR ml/min/1 73sq m 94   CALCIUM mg/dL 9 4             Results from last 7 days   Lab Units 01/16/21  1818   GLUCOSE RANDOM mg/dL 81       Results from last 7 days   Lab Units 01/16/21  1818   PROTIME seconds 12 8   INR  0 96   PTT seconds 28     Results from last 7 days   Lab Units 01/16/21  1834   INFLUENZA A PCR  Negative   INFLUENZA B PCR  Negative   RSV PCR  Negative       ED Treatment:   Medication Administration from 01/16/2021 1543 to 01/16/2021 2059       Date/Time Order Dose Route Action Comments     01/16/2021 1607 HYDROmorphone (DILAUDID) injection 1 mg 1 mg Intravenous Given 0 5 mg given at a time - 5 minutes apart to monitor BP and tolerance per Dr Lashay Valdes at bedside  01/16/2021 1607 ondansetron (ZOFRAN) injection 4 mg 4 mg Intravenous Given      01/16/2021 1709 fentanyl citrate (PF) 100 MCG/2ML 50 mcg 50 mcg Intravenous Given      01/16/2021 1827 HYDROmorphone (DILAUDID) injection 1 mg 1 mg Intravenous Given         History reviewed  No pertinent past medical history    Present on Admission:   Fracture of tibia and fibula, shaft, left, closed, initial encounter      Admitting Diagnosis: Leg injury [S89 90XA]  Closed fracture shaft fibula and tibia, left, initial encounter [X50 859X, S82 402A]  Age/Sex: 26 y o  female     Admission Orders:  Scheduled Medications:  acetaminophen (TYLENOL) tablet 650 mg   Dose: 650 mg  Freq: Every 6 hours scheduled Route: PO  Start: 01/17/21 1345     ceFAZolin (ANCEF) IVPB (premix in dextrose) 2,000 mg 50 mL   Dose: 2,000 mg  Freq: Every 8 hours Route: IV  Start: 01/17/21 1830    Continuous IV Infusions:  lactated ringers infusion   Rate: 75 mL/hr Dose: 75 mL/hr  Freq: Continuous Route: IV  Last Dose: Stopped (01/17/21 1358)  Start: 01/17/21 0000    PRN Meds:  HYDROmorphone (DILAUDID) injection 0 5 mg   Dose: 0 5 mg  Freq: Every 5 minutes PRN Route: IV 1/17 x4  PRN Reason: severe pain  Indications of Use: MODERATE TO SEVERE PAIN  Start: 01/17/21 1202 End: 01/17/21 1233    HYDROmorphone (DILAUDID) injection 0 5 mg   Dose: 0 5 mg  Freq: Every 3 hours PRN Route: IV 1/16 x1, 1/17 x1  PRN Reason: breakthrough pain    ondansetron (ZOFRAN) injection 4 mg   Dose: 4 mg  Freq: Every 4 hours PRN Route: IV 1/17 x2  PRN Reason: nausea  Start: 01/17/21 1440 End: 01/18/21 1438    oxyCODONE (ROXICODONE) immediate release tablet 10 mg   Dose: 10 mg  Freq: Every 4 hours PRN Route: PO 17 x1  PRN Reason: severe pain  Start: 01/17/21 1336 End: 01/18/21 1438    Regular Post op diet   Weight Bearing restrictions  Neurovascular checks q4h  SCD/ Foot pump    Network Utilization Review Department  ATTENTION: Please call with any questions or concerns to [phone] and carefully listen to the prompts so that you are directed to the right person  All voicemails are confidential   Naeem Vinson all requests for admission clinical reviews, approved or denied determinations and any other requests to dedicated fax number below belonging to the campus where the patient is receiving treatment   List of dedicated fax numbers for the Facilities:  [address] DENIALS (Administrative/Medical Necessity) [phone]   [address] (Maternity/NICU/Pediatrics) [phone]   [address] ([facility]) [address] [address] [place] [address] [phone]

## 2021-01-21 NOTE — TELEPHONE ENCOUNTER
Patient's  is calling back and patient his having worsening symptoms of pain in operative leg without relief and the R leg pain is getting worse  Please advise if patient can come into office for evaluation of increased pain today?

## 2021-01-21 NOTE — PROGRESS NOTES
Assessment  Diagnoses and all orders for this visit:    Fracture of tibia and fibula, shaft, left, closed, initial encounter    Status post left tibia IM nail insertion     Sprain of medial collateral ligament of left knee, initial encounter    Discussion and Plan:    WBAT LLE in CAM boot  WBAT RLE, may use the knee brace they have for support  A script for a wheelchair was offered to the patient secondary to her difficulty moving around with acute Bilateral lower extremity pain, but they are expecting to borrow one tonight from a friend   A prescription for robaxin and nabumetone were sent to her pharmacy  Continue Lovenox  Continue to take oxy for severe pain as needed  Follow up as scheduled for 2 weeks postop visit for suture removal; no xrays necessary at this visit    Subjective:   Patient ID: Niharika Saab is a 26 y o  female      Ángela Hwang is a 15-year-old female that presents for postop evaluation  Patient sustained a left tibfib fracture last Saturday 01/16/2021 after twisting her leg skiing  Patient went to the OR for insertion of intramedullary nail to left tibia the next day Sunday 1/17/21  Patient was discharged from the hospital on postop day 1, at home she has been complaining of uncontrollable pain that is not responding well to Tylenol and oxycodone  She has not been unable to rest secondary to this pain  Patient was discharged weight-bearing as tolerated in the left lower extremity with cam walker  Patient did notice some pain on the right medial side of the knee and this is causing her also discomfort with weight-bearing  Patient and  state that she has been compliant with Lovenox as prescribed  No recent fever, chills, chest pain, shortness of breath             The following portions of the patient's history were reviewed and updated as appropriate: allergies, current medications, past family history, past medical history, past social history, past surgical history and problem list     Review of Systems   Constitutional: Negative for appetite change and fever  HENT: Negative for sore throat  Eyes: Negative for visual disturbance  Respiratory: Negative for shortness of breath  Cardiovascular: Negative for chest pain  Gastrointestinal: Negative for nausea and vomiting  Musculoskeletal: Positive for arthralgias and myalgias  Skin: Negative for rash and wound  Psychiatric/Behavioral: The patient is nervous/anxious  Objective:  /67   Pulse 94   LMP 01/04/2021       Ortho Exam    Physical Exam  Vitals signs and nursing note reviewed  Constitutional:       Appearance: Normal appearance  HENT:      Head: Normocephalic and atraumatic  Nose: Nose normal       Mouth/Throat:      Mouth: Mucous membranes are moist    Eyes:      Extraocular Movements: Extraocular movements intact  Conjunctiva/sclera: Conjunctivae normal    Neck:      Musculoskeletal: Normal range of motion  Cardiovascular:      Rate and Rhythm: Normal rate  Pulses: Normal pulses  Pulmonary:      Effort: Pulmonary effort is normal       Breath sounds: Normal breath sounds  Abdominal:      Palpations: Abdomen is soft  Musculoskeletal:      Comments: LLE:   dressing taken down  Incisions are well appearing with nylon sutures in place  No evidence of erythema or drainage  Have muscle atrophy is soft and depressible  Tenderness to palpation is present along the distal tibial shaft and proximal fibula  Patient is able to range her toes  Sensation is preserved and intact in her foot  RLE:   Mild ecchymosis noted in medial knee  There is mild ecchymosis on the medial aspect of the knee with associated mild tenderness to palpation  mild knee effusion  Able to straight leg raise without problems  Neurovascularly intact distally  Negative valgus and varus stress   Skin:     General: Skin is warm  Capillary Refill: Capillary refill takes less than 2 seconds     Neurological: General: No focal deficit present  Mental Status: She is alert and oriented to person, place, and time  Mental status is at baseline  Psychiatric:         Mood and Affect: Mood normal          Behavior: Behavior normal          I have personally reviewed pertinent films in PACS and my interpretation is as follows      Xrays right knee from today 1/21/2021 show no evidence of fractures or dislocations, well appearing joint    Xrays Left tibia fibula from today 1/21/2021 show an intramedullary nail in the tibia, fracture of the tibia and fibula are in well alignment

## 2021-01-21 NOTE — TELEPHONE ENCOUNTER
Patient was called to see how she was doing today  Msg above relayed and patient verbalized understanding  Cap refill remains WNL of operative extremity  Still having pressure to bottom of foot, advised she can try loosening outer ace wrap to see if this relieves some pressure  Call office if worsening of symptoms

## 2021-01-22 NOTE — UTILIZATION REVIEW
Notification of Discharge  This is a Notification of Discharge from our facility [address]  Please be advised that this patient has been discharge from our facility  Below you will find the admission and discharge date and time including the patients disposition  PRESENTATION DATE: 1/16/2021  3:43 PM  OBS ADMISSION DATE:   IP ADMISSION DATE: 1/16/21 1852   DISCHARGE DATE: 1/18/2021 12:00 PM  DISPOSITION: Home/Self Care Home/Self Care   Admission Orders listed below:  Admission Orders (From admission, onward)     Ordered        01/16/21 1853  INPATIENT ADMISSION  Once                   Please contact the UR Department if additional information is required to close this patient's authorization/case  [address] Utilization Review Department  Main: [phone] x carefully listen to the prompts  All voicemails are confidential   [email or web address]  Send all requests for admission clinical reviews, approved or denied determinations and any other requests to dedicated fax number below belonging to the campus where the patient is receiving treatment   List of dedicated fax numbers:  [address] DENIALS (Administrative/Medical Necessity) [phone]   [address] (Maternity/NICU/Pediatrics) [phone]   [facility] [phone]   [facility] [phone]   [facility] [phone]   [facility] [phone]   [address] [facility] [facility] [phone]   1101 [facility] [phone]   2207 [facility], [facility]  [phone]   Mirta Rodrigues Hubertus Gómez Jean Baptiste MD   Resident   Orthopedics   Discharge Summary       Attested   Date of Service:  1/18/2021 10:59 AM               Attested        Attestation signed by Magnolia Fernandez MD at 1/18/2021 11:25 AM   Patient was seen and examined this morning  Case was reviewed with the resident physician this morning  I agree the history, exam, assessment and plan as documented resident physician           Show:Clear all  [x]Manual[x]Template[]Copied    Added by:  [x]Oliver aWtkins MD    []Kathrin for details  Discharge Summary - Orthopedics   Rina Rock 26 y o  female MRN: [id number]  Unit/Bed#: [facility] 620-01     Attending Physician: Tiana Mayes     Admitting diagnosis: Leg injury [S89 90XA]  Closed fracture shaft fibula and tibia, left, initial encounter Sav Perez, S82 402A]     Discharge diagnosis: Status post left tibia IM nail insertion     Date of admission: 1/16/2021     Date of discharge: 01/18/21     Procedure: Left tibial IM nail insertion       HPI & Hospital course:  26 y o  female who presented to the ED after a twisting injury while skiing sustaining a left tibia and fibula fracture  Pt was admitted to the orthopaedic service and taken to the OR on 1/17/2021 for insertion of left tibia IM nail  Prior to surgery the risks and benefits of surgery were explained and informed consent was obtained  Surgery went without complications and pt was discharged to the PACU in a stable condition and was transferred to the floor  Lovenox was started for DVT ppx 12hrs postoperatively  On discharge date pt was cleared by PT and the medicine team and determined to be safe for discharge  Daily discussion was had with the patient, nursing staff, orthopaedic team, and family members if present  All questions were answered to the patients satisifaction               0   Lab Value Date/Time     HGB 10 4 (L) 01/18/2021 0549     HGB 13 3 01/16/2021 1818         Greater than 2 gram decrease in Hb qualifies for diagnosis of acute blood loss anemia   Vital signs remained stable and pt was resuscitated with IVF as needed       Discharge Instructions:   · Weight bearing as tolerated left lower extremity in CAM boot · Keep dressings clean and dry at all times   · Complete DVT prophylaxis as prescribed   · Physical therapy  · Follow-up as scheduled, otherwise call for appt       Discharge Medications:   For the complete list of discharge medications, please refer to the patient's medication reconciliation                    Cosigned by: Vena Blizzard, MD at 1/18/2021 11:25 AM   Revision History

## 2021-01-26 ENCOUNTER — TELEPHONE (OUTPATIENT)
Dept: OBGYN CLINIC | Facility: HOSPITAL | Age: 31
End: 2021-01-26

## 2021-01-26 NOTE — TELEPHONE ENCOUNTER
Please advise if the following patient can be forced onto the schedule:  Patient: Neda Sylvester  : 1990  MRN: [id number]  Call back # [phone]  Reason for appointment: patient had a post op appointment for her left tibia scheduled for  but had to cancel due to no transportation  Patient would like to know if she can have an appointment sometime in that week around 4:15 so her  can bring her    Requested doctor/location: [facility]      Emailed practice admin

## 2021-02-03 ENCOUNTER — OFFICE VISIT (OUTPATIENT)
Dept: OBGYN CLINIC | Facility: HOSPITAL | Age: 31
End: 2021-02-03

## 2021-02-03 VITALS — HEART RATE: 99 BPM | SYSTOLIC BLOOD PRESSURE: 128 MMHG | HEIGHT: 64 IN | DIASTOLIC BLOOD PRESSURE: 71 MMHG

## 2021-02-03 DIAGNOSIS — Z87.81 S/P ORIF (OPEN REDUCTION INTERNAL FIXATION) FRACTURE: ICD-10-CM

## 2021-02-03 DIAGNOSIS — Z87.81 STATUS POST OPEN REDUCTION AND INTERNAL FIXATION (ORIF) OF FRACTURE: Primary | ICD-10-CM

## 2021-02-03 DIAGNOSIS — Z98.890 S/P ORIF (OPEN REDUCTION INTERNAL FIXATION) FRACTURE: ICD-10-CM

## 2021-02-03 DIAGNOSIS — Z98.890 STATUS POST OPEN REDUCTION AND INTERNAL FIXATION (ORIF) OF FRACTURE: Primary | ICD-10-CM

## 2021-02-03 PROCEDURE — 99024 POSTOP FOLLOW-UP VISIT: CPT | Performed by: ORTHOPAEDIC SURGERY

## 2021-02-03 NOTE — PROGRESS NOTES
Assessment:   Diagnosis ICD-10-CM Associated Orders   1  Status post left tibia IM nail insertion   Z98 890 Ambulatory referral to Physical Therapy    Z87 81    2  S/P ORIF (open reduction internal fixation) fracture  Z98 890 Ambulatory referral to Physical Therapy    Z87 81        Plan:  Two weeks status post IM nail for tibia fracture fixation  Patient's incisions are healed and sutures removed successfully  She may get her incisions wet, do not submerge, pat dry  Avoid topical creams for several more days  Continue with boot and walker  Elevation for swelling reduction  Continue with Lovenox until its completion  Referral placed for outpatient physical therapy  WBAT in the boot  To do next visit:  Return in about 4 weeks (around 3/3/2021) for re-check with x-rays left tibia-fibula  The above stated was discussed in layman's terms and the patient expressed understanding  All questions were answered to the patient's satisfaction  Scribe Attestation    I,:  Chase Kelley am acting as a scribe while in the presence of the attending physician :       I,:  Janie Mendoza MD personally performed the services described in this documentation    as scribed in my presence :             Subjective:   Esther Ramirez is a 26 y o  female who presents repeat evaluation 2 weeks status post IM nailing for left tibia fracture  She has been compliant with her boot and walker  She places very minimal weight on her left lower extremity and only over the past several days  She has been administering her Lovenox for DVT prophylaxis  She does note swelling that is improved with elevation  She denies any calf or thigh pain  She presents today for incision check and suture removal   She notes improve sensation at her great toe  Review of systems negative unless otherwise specified in HPI  Review of Systems    History reviewed  No pertinent past medical history      Past Surgical History:   Procedure Laterality Date    IL TREAT TIBIAL SHAFT FX, INTRAMED IMPLANT Left 1/17/2021    Procedure: INSERTION NAIL IM TIBIA;  Surgeon: Guilherme Johnson MD;  Location: [facility];  Service: Orthopedics       History reviewed  No pertinent family history  Social History     Occupational History    Not on file   Tobacco Use    Smoking status: Never Smoker    Smokeless tobacco: Never Used   Substance and Sexual Activity    Alcohol use: Never     Frequency: Never    Drug use: Never    Sexual activity: Yes     Partners: Male         Current Outpatient Medications:     docusate sodium (COLACE) 100 mg capsule, Take 1 capsule (100 mg total) by mouth 2 (two) times a day, Disp: 10 capsule, Rfl: 0    enoxaparin (LOVENOX) 40 mg/0 4 mL, Inject 0 4 mL (40 mg total) under the skin daily for 28 days To start post op, Disp: 11 2 mL, Rfl: 0    methocarbamol (ROBAXIN) 500 mg tablet, Take 1 tablet (500 mg total) by mouth 3 (three) times a day, Disp: 30 tablet, Rfl: 0    nabumetone (RELAFEN) 750 mg tablet, Take 1 tablet (750 mg total) by mouth 2 (two) times a day, Disp: 30 tablet, Rfl: 0    oxyCODONE (ROXICODONE) 5 mg immediate release tablet, 1 pill po Q4 Hrs prn, Disp: 30 tablet, Rfl: 0    senna (SENOKOT) 8 6 mg, Take 1 tablet (8 6 mg total) by mouth daily at bedtime for 14 days, Disp: 14 tablet, Rfl: 0    No Known Allergies         Vitals:    02/03/21 1622   BP: 128/71   Pulse: 99       Objective:                    Left Knee Exam     Range of Motion   Extension: normal     Other   Erythema: absent  Sensation: normal  Swelling: mild    Comments:    Healed incisions both proximally as well as distal of her left lower extremity  All sutures removed successfully  She is diffuse resolving ecchymosis throughout  Calf is soft nontender no signs DVT              Diagnostics, reviewed and taken today if performed as documented:    None performed        Procedures, if performed today:    Procedures    None performed      Portions of the record may have been created with voice recognition software  Occasional wrong word or "sound a like" substitutions may have occurred due to the inherent limitations of voice recognition software  Read the chart carefully and recognize, using context, where substitutions have occurred

## 2021-02-11 ENCOUNTER — EVALUATION (OUTPATIENT)
Dept: PHYSICAL THERAPY | Age: 31
End: 2021-02-11
Payer: COMMERCIAL

## 2021-02-11 DIAGNOSIS — Z98.890 STATUS POST OPEN REDUCTION AND INTERNAL FIXATION (ORIF) OF FRACTURE: ICD-10-CM

## 2021-02-11 DIAGNOSIS — Z87.81 STATUS POST OPEN REDUCTION AND INTERNAL FIXATION (ORIF) OF FRACTURE: ICD-10-CM

## 2021-02-11 PROCEDURE — 97110 THERAPEUTIC EXERCISES: CPT | Performed by: PHYSICAL THERAPIST

## 2021-02-11 PROCEDURE — 97163 PT EVAL HIGH COMPLEX 45 MIN: CPT | Performed by: PHYSICAL THERAPIST

## 2021-02-11 NOTE — PROGRESS NOTES
PT Evaluation     Today's date: 2021  Patient name: James Patel  : 1990  MRN: [id number]  Referring provider: Keanu Patel MD  Dx:   Encounter Diagnosis     ICD-10-CM    1  Status post left tibia IM nail insertion   Z98 890 Ambulatory referral to Physical Therapy    Z87 [id number]                   Assessment  Assessment details: PT IE:2021  Patient reported on 2021 she had a ski accident while at [facility]  Patient noted she was twisted when she fell  Patient noted she was taken to [facility]  Patient noted she had surgery on 2021  Patient noted she was d/c to home on 2021  Patient noted she was d/c to home  Patient noted she was 4 weeks NWB  Patient noted she is now WBAT and uses RW  Patient noted she is still on lovenox  Patient noted she has dependent ruber  Patient had lateral sided left great toe is numb as well as numbness at the 4 incision sites  Patient noted 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[place] [address]  Patient noted she is concerned about returning to work  Patient noted she is unable to drive  Patient noted she is using a shower chair and grab bars  Patient noted she needs RW with all gait and standing activities  Patient noted sleep remains deprived due to left ankle pain  Patient noted left ankle remains edematous  Patient noted she has left lower distal anterior leg, anterior left ankle dorsum and left knee   Patient noted right knee is painful since injury as well      Impairments: abnormal gait, abnormal or restricted ROM, abnormal movement, activity intolerance, lacks appropriate home exercise program and pain with function  Understanding of Dx/Px/POC: excellentPrognosis details: Patient is a 26y o  year old female seen for outpatient PT evaluation with pain, mobility and functional deficits due to left ankle and knee pain due to ski accident that caused left tibial and fibular fracture that required subsequent s/p ORIF left tibial fracture  Patient presents to PT IE with the following problems, concerns, deficits and impairments: left ankle and foot pain, decreased left le range of motion, decreased left le strength, left le edema, + TTP, gait and stair dysfunctions, transfer dysfunctions, functional limitations and decreased tolerance to activity  Patient would benefit from skilled PT services under the following PT treatment plan to address the above noted deficits: therapeutic exercises and activities to facilitate left le rom and strength, modalities, manual therapy techniques, Kinesio taping techniques, IASTM techiques, gait and stair training, transfer training, balance and proprioception activities and a hep  Thank you for the referral      Goals  Short Term goals 4 - 6 weeks  1  Patient will be independent HEP  2   Patient will report a 25 - 50% decrease in pain complaints  3   Increase strength 1/2 grade  4   Increase ROM 5-10 degrees  Long Term goals 8 - 12 weeks  1  Patient will report elimination of pain complaints  2   Patient will return to all work related activities without restriction  3   Patient will return to all recreational activities without restriction  4   ROM WFL  5   Strength 5/5   6   Patient will ambulation without an assistive device  7   Patient will perform stair ascent and descent in a reciprocal pattern with unilateral railing  8   Patient will perform all car, chair and bed transfers without left knee and ankle pain aggravation  9   Patient will exhibit left le edema down by > 10 mm  10   Patient will be shower independently in standing  11   Patient will be able to get dressed without left knee and ankle pain limitations  12   Patient will exhibit TUG less than 12 seconds  13   Patient will be - TTP      Plan  Patient would benefit from: skilled physical therapy  Planned modality interventions: cryotherapy, TENS, thermotherapy: hydrocollator packs, ultrasound and unattended electrical stimulation  Planned therapy interventions: joint mobilization, manual therapy, massage, aquatic therapy, balance, balance/weight bearing training, neuromuscular re-education, patient education, postural training, body mechanics training, self care, strengthening, compression, stretching, therapeutic activities, therapeutic exercise, therapeutic training, transfer training, flexibility, functional ROM exercises, gait training, graded activity, graded exercise, graded motor and home exercise program  Frequency: 1x week  Duration in visits: 12  Treatment plan discussed with: patient and family        Subjective Evaluation    History of Present Illness  Mechanism of injury: Patient's PMHx is unremarkable  Left le WBAT  X-ray 2021  LEFT TIBIA AND FIBULA     INDICATION:   S82 202A: Unspecified fracture of shaft of left tibia, initial encounter for closed fracture  S82 402A: Unspecified fracture of shaft of left fibula, initial encounter for closed fracture      COMPARISON:  2021     VIEWS:  XR TIBIA FIBULA 2 VW LEFT         FINDINGS:     Anatomic alignment status post ORIF for a mid to distal 3rd diaphysis tibial fracture with butterfly component  IM lyn in proximal/distal interlocking screws noted without hardware complication  Stable minimally displaced proximal fibular diaphysis   fracture      No significant degenerative changes      No lytic or blastic osseous lesion      Soft tissues are unremarkable      IMPRESSION:     Near-anatomic alignment status post ORIF for a left tibial fracture without hardware complication  Stable fibular fracture  Pain  At best pain ratin  At worst pain ratin  Location: left knee distal tibia and ankle dorsum      Patient Goals  Patient goals for therapy: decreased edema, decreased pain, improved balance, increased motion, increased strength, independence with ADLs/IADLs, return to sport/leisure activities and return to work          Objective     Tenderness     Additional Tenderness Details  Patient is + moderate to severe TTP at 4 incision sites that are healing by primary intention  Patient is + moderate to severe TTP at mid to distal tibial region and left peripatellar region  Active Range of Motion   Left Hip   Flexion: 44 degrees with pain  Abduction: 6 degrees with pain    Right Hip   Flexion: 108 degrees   Abduction: 24 degrees   Left Knee   Flexion: 62 degrees with pain  Extension: -20 degrees with pain  Extensor lag: Left knee extensor lag: unable to perform active slr  with pain    Right Knee   Flexion: 128 degrees with pain  Extension: -4 degrees with pain  Extensor la degrees   Left Ankle/Foot   Dorsiflexion (ke): -22 degrees   Plantar flexion: 38 degrees     Right Ankle/Foot   Dorsiflexion (ke): 14 degrees   Plantar flexion: 46 degrees     Strength/Myotome Testing     Left Hip   Planes of Motion   Flexion: 3+  Extension: 3+  Abduction: 3+  Adduction: 4-    Right Hip   Planes of Motion   Flexion: 4  Extension: 4+  Abduction: 4+  Adduction: 5    Left Knee   Flexion: 3-  Extension: 2-    Right Knee   Flexion: 4+  Extension: 5    Left Ankle/Foot   Dorsiflexion: 2-  Plantar flexion: 3-    Right Ankle/Foot   Dorsiflexion: 4+  Plantar flexion: 5    Ambulation     Ambulation: Level Surfaces   Ambulation with assistive device: independent    Additional Level Surfaces Ambulation Details  Patient ambulates with RW with CAM boot with left step to gait pattern with approximately 10-25 % weight baring with foot flat into stance phase  Ambulation: Stairs   Ascend stairs: unable  Descend stairs: unable    General Comments: Ankle/Foot Comments   Girth Measurements:    Left ankle / Knee:  Superior to medial and lateral malleolus: 20 1 cm  10 cm above lateral malleolus: 24 5 cm    20 cm above lateral malleolus: 31 5 cm  Suprapatellar region: 35 1 cm             Precautions: Patient is Left LE WBAT in CAM boot      Manuals 2/11            Left knee and ankle prom                                                    Neuro Re-Ed                                                                                                        Ther Ex             Nu step             Seated hr and tr:L:             Seated heel slides:L:             Seated LAQ:L 5 x            Seated hip flexion:L             Supine gastroc stretch with strap             Supine hamstring stretch:L             Quad set:L 5 x            Ankle pumps:L 5 x             Heel slides:L 5 x             slr flexion:L: aarom with strap            SAQ:B:             Bridges with bolster             Hip abduction in right side lying:L             Hip extension in prone:L             Prone knee flexion self stretch:L             Prone knee flexion aromL             Prone knee TKE:L:                          All standing activities with bilateral ue support! Standing slr x 3:L 5 x L only            Standing hr and tr:B:             Standing weight shift left and right with bilateral ue support!              Standing hamstring curls:L             Standing mini squats to chair             Standing lunge on foam block:L             Standing tandem stance             Forward step ups:L 2"            Lateral step ups:L 2"                                                                Ther Activity                                       Gait Training                                       Modalities             Cp to left knee and ankle supine

## 2021-02-18 ENCOUNTER — APPOINTMENT (OUTPATIENT)
Dept: PHYSICAL THERAPY | Age: 31
End: 2021-02-18
Payer: COMMERCIAL

## 2021-02-24 ENCOUNTER — OFFICE VISIT (OUTPATIENT)
Dept: PHYSICAL THERAPY | Age: 31
End: 2021-02-24
Payer: COMMERCIAL

## 2021-02-24 DIAGNOSIS — Z87.81 STATUS POST OPEN REDUCTION AND INTERNAL FIXATION (ORIF) OF FRACTURE: Primary | ICD-10-CM

## 2021-02-24 DIAGNOSIS — Z98.890 STATUS POST OPEN REDUCTION AND INTERNAL FIXATION (ORIF) OF FRACTURE: Primary | ICD-10-CM

## 2021-02-24 PROCEDURE — 97110 THERAPEUTIC EXERCISES: CPT | Performed by: PHYSICAL THERAPIST

## 2021-02-24 PROCEDURE — 97140 MANUAL THERAPY 1/> REGIONS: CPT | Performed by: PHYSICAL THERAPIST

## 2021-02-24 NOTE — PROGRESS NOTES
Daily Note     Today's date: 2021  Patient name: Eugenie St  : 1990  MRN: [id number]  Referring provider: Claudio Garcia MD  Dx:   Encounter Diagnosis     ICD-10-CM    1  Status post left tibia IM nail insertion   Z98 890     Z87 81                   Subjective: Patient reported left le pain is at 4 of 10  Patient reported left knee has pain limit knee extension and flexion mobility  Patient noted she is starting to put weight with gait in left CAM boot but has intermittent pins and needle paresthesias with weight baring  Patient reported she can tolerate up to 40 % weight braining in left le with CAM boot during gait  Objective: See treatment diary below  Assessment: Tolerated treatment well  Patient demonstrated fatigue post treatment  Patient exhibits decrease in left sided weight shift with left stance phase ( due to pain, weakness and apprehension ) but with verbal cuing she is improving her left weight shift and weight stance with gait with RW  Patient exhibited left knee mobility improvements with use of manual therapy techniques and therapeutic exercises with extension minus 8 and flexion at 90 degrees arom respectively despite pain and weakness limitations  Patient exhibits left le incisions without dehiscence, drainage or erythema  Patient and spouse educated on proper gait pattern with RW, hep, retrograde massage and shoe extender /  for right shoe  Plan: Continue per plan of care        Precautions: Patient is Left LE WBAT in CAM boot      Manuals            Left knee and ankle prom  10 min total           Left le retrograde / Desentization massage  10 min total                                     Neuro Re-Ed                                                                                                        Ther Ex             Nu step  8 min           Seated hr and tr:L:  NT           Seated heel slides:L:  NT           Seated LAQ:L 5 x 10 x Seated hip flexion:L  10 x            Supine gastroc stretch with strap  NT           Supine hamstring stretch:L  NT           Quad set:L 5 x 3 sec x 10           Ankle pumps:L 5 x  10 x            Heel slides:L 5 x  10 x            slr flexion:L: aarom with strap aarom with PT 10 x            SAQ:B:  10 x            Bridges with bolster  10 x            Hip abduction in right side lying:L  NT           Hip extension in prone:L  10 x            Prone knee flexion self stretch:L  NT           Prone knee flexion aromL  10 x            Prone knee TKE:L:  NT                        All standing activities with bilateral ue support! Standing slr x 3:L 5 x L only L x 10           Standing hr and tr:B:  NT           Standing weight shift left and right with bilateral ue support!   B x 10           Standing hamstring curls:L  NT           Standing mini squats to chair  NT           Standing lunge on foam block:L  NT           Standing tandem stance  NT           Forward step ups:L 2" NT           Lateral step ups:L 2"  NT                                                               Ther Activity                                       Gait Training                                       Modalities             Cp to left knee and ankle supine  NT

## 2021-03-01 ENCOUNTER — OFFICE VISIT (OUTPATIENT)
Dept: PHYSICAL THERAPY | Age: 31
End: 2021-03-01
Payer: COMMERCIAL

## 2021-03-01 DIAGNOSIS — Z87.81 STATUS POST OPEN REDUCTION AND INTERNAL FIXATION (ORIF) OF FRACTURE: Primary | ICD-10-CM

## 2021-03-01 DIAGNOSIS — Z98.890 STATUS POST OPEN REDUCTION AND INTERNAL FIXATION (ORIF) OF FRACTURE: Primary | ICD-10-CM

## 2021-03-01 PROCEDURE — 97110 THERAPEUTIC EXERCISES: CPT | Performed by: PHYSICAL THERAPIST

## 2021-03-01 PROCEDURE — 97140 MANUAL THERAPY 1/> REGIONS: CPT | Performed by: PHYSICAL THERAPIST

## 2021-03-01 NOTE — PROGRESS NOTES
Daily Note     Today's date: 3/1/2021  Patient name: Ignacio Pacheco  : 1990  MRN: [id number]  Referring provider: Ana Castellano MD  Dx:   Encounter Diagnosis     ICD-10-CM    1  Status post left tibia IM nail insertion   Z98 890     Z87 81                   Subjective: Patient reported left le pain is at 3 of 10  Patient denies pain aggravation after last PT visit  But, she noted she had left foot edema and thus elevated her bilateral le last night which decreased her edema  Patient noted she is has pins and needle paresthesias in left calcaneal region with weight baring  Patient reported she can tolerate up to 40-50  % weight braining in left le with CAM boot during gait  Objective: See treatment diary below  Assessment: Tolerated treatment well  Patient demonstrated fatigue post treatment  Patient exhibited left knee mobility improvements with use of new taught hep and stretching activities and therapeutic exercises with extension minus 6 and flexion at 102 degrees arom respectively despite pain and weakness limitations  Patient exhibits left le incisions without dehiscence, drainage or erythema  Patient still exhibits patello-femoral joint pain with terminal knee extension that mimics mid stance phase of gait thus use of kinesio taping to provide stability of left patella which did improve TKE by reducing pain  Patient taught and provided new hep and kinesio taping techniques to promote patellar stability  Plan: Continue per plan of care        Precautions: Patient is Left LE WBAT in CAM boot      Manuals 2/11 2/24 3/1          Left knee and ankle prom  10 min total           Left le retrograde / Desentization massage  10 min total 10 min along with Kinesio taping in a "Y" pattern with base above patella and moving distal at medial and lateral patella at 25 % tension with pt educated that we do not utilize tape on the incision                                    Neuro Re-Ed Ther Ex             Nu step  8 min 10 min          Seated hr and tr:L:  NT NT          Seated heel slides:L:  NT NT          Seated LAQ:L 5 x 10 x  2 x 10          Seated hip flexion:L  10 x  2 x 10          Supine gastroc stretch with strap  NT 20 sec x 5          Supine hamstring stretch:L  NT 20 sec x 5          Quad set:L 5 x 3 sec x 10 3 sec x 20          Ankle pumps:L 5 x  10 x  10 x 2          Heel slides:L 5 x  10 x  2 x 10          slr flexion:L: aarom with strap aarom with PT 10 x  aarom x 10          SAQ:B:  10 x  2 x 10          Bridges with bolster  10 x  2 x 10          Hip abduction in right side lying:L  NT NT          Hip extension in prone:L  10 x  NT          Prone knee flexion self stretch:L  NT NT          Prone knee flexion aromL  10 x  NT          Prone knee TKE:L:  NT NT                       All standing activities with bilateral ue support! Standing slr x 3:L 5 x L only L x 10 2 x 10 L and 2 x 5 R          Standing hr and tr:B:  NT NT          Standing weight shift left and right with bilateral ue support!   B x 10 10 x           Standing hamstring curls:L  NT NT          Standing mini squats to chair  NT NT          Standing lunge on foam block:L  NT NT          Standing tandem stance  NT NT          Forward step ups:L 2" NT NT          Lateral step ups:L 2"  NT NT                                                              Ther Activity                                       Gait Training                                       Modalities             Cp to left knee and ankle supine  NT

## 2021-03-04 ENCOUNTER — OFFICE VISIT (OUTPATIENT)
Dept: OBGYN CLINIC | Facility: HOSPITAL | Age: 31
End: 2021-03-04

## 2021-03-04 ENCOUNTER — HOSPITAL ENCOUNTER (OUTPATIENT)
Dept: RADIOLOGY | Facility: HOSPITAL | Age: 31
Discharge: HOME | End: 2021-03-04
Attending: ORTHOPAEDIC SURGERY
Payer: COMMERCIAL

## 2021-03-04 VITALS — HEIGHT: 64 IN | SYSTOLIC BLOOD PRESSURE: 136 MMHG | HEART RATE: 96 BPM | DIASTOLIC BLOOD PRESSURE: 69 MMHG

## 2021-03-04 DIAGNOSIS — Z48.89 AFTERCARE FOLLOWING SURGERY: ICD-10-CM

## 2021-03-04 DIAGNOSIS — S82.402A FRACTURE OF TIBIA AND FIBULA, SHAFT, LEFT, CLOSED, INITIAL ENCOUNTER: ICD-10-CM

## 2021-03-04 DIAGNOSIS — Z87.81 STATUS POST OPEN REDUCTION AND INTERNAL FIXATION (ORIF) OF FRACTURE: ICD-10-CM

## 2021-03-04 DIAGNOSIS — S82.202A FRACTURE OF TIBIA AND FIBULA, SHAFT, LEFT, CLOSED, INITIAL ENCOUNTER: ICD-10-CM

## 2021-03-04 DIAGNOSIS — Z98.890 STATUS POST OPEN REDUCTION AND INTERNAL FIXATION (ORIF) OF FRACTURE: ICD-10-CM

## 2021-03-04 DIAGNOSIS — Z48.89 AFTERCARE FOLLOWING SURGERY: Primary | ICD-10-CM

## 2021-03-04 PROCEDURE — 73590 X-RAY EXAM OF LOWER LEG: CPT

## 2021-03-04 PROCEDURE — 99024 POSTOP FOLLOW-UP VISIT: CPT | Performed by: ORTHOPAEDIC SURGERY

## 2021-03-04 NOTE — PROGRESS NOTES
Assessment  Diagnoses and all orders for this visit:    Aftercare following surgery    Fracture of tibia and fibula, shaft, left, closed, initial encounter    Status post left tibia IM nail insertion       Discussion and Plan:    Weightbearing as tolerated left lower extremity with a Cam boot; continue to progress as tolerated and wean out of the Cam boot and a walker as tolerated  Continue physical therapy to continue improving strengthening and stretching of the ankle and knee   Follow-up in 6 weeks with repeat x-rays upon arrival    Subjective:   Patient ID: Mary Pemberton is a 26 y o  female      33-year-old female presents for postop visit for 6 week left tibial intramedullary nailing for displaced left tib-fib fracture sustained in a skiing accident  She presents with her   She is currently ambulating with a walker and her Cam boot, she has been doing approximately 50% of weight-bearing secondary to discomfort  He she did physical therapy and feels that she is progressing especially with her range of motion of her knee and ankle  Her pain has been well controlled  She denies any chest pain, shortness of breath, fevers  The following portions of the patient's history were reviewed and updated as appropriate: allergies, current medications, past family history, past medical history, past social history, past surgical history and problem list     Review of Systems   Constitutional: Negative for appetite change and fever  HENT: Negative for sore throat  Eyes: Negative for visual disturbance  Respiratory: Negative for shortness of breath  Cardiovascular: Negative for chest pain  Gastrointestinal: Negative for nausea and vomiting  Musculoskeletal: Positive for arthralgias and myalgias  Skin: Negative for rash and wound         Objective:  /69   Pulse 96   Ht 5' 4" (1 626 m)       Ortho Exam   Musculoskeletal left lower extremity:  Well healed leg scars  Ankle range of motion with almost complete dorsiflexion knee range of motion from 0 to roughly 100°   There is no knee effusion  There is minimal swelling   There is no tenderness on the calf    Physical Exam  Vitals signs and nursing note reviewed  Constitutional:       Appearance: Normal appearance  HENT:      Head: Normocephalic and atraumatic  Nose: Nose normal       Mouth/Throat:      Mouth: Mucous membranes are moist    Eyes:      Extraocular Movements: Extraocular movements intact  Conjunctiva/sclera: Conjunctivae normal    Neck:      Musculoskeletal: Normal range of motion  Cardiovascular:      Rate and Rhythm: Normal rate  Pulses: Normal pulses  Pulmonary:      Effort: Pulmonary effort is normal       Breath sounds: Normal breath sounds  Abdominal:      Palpations: Abdomen is soft  Musculoskeletal:      Comments: See Ortho Exam   Skin:     General: Skin is warm  Capillary Refill: Capillary refill takes less than 2 seconds  Neurological:      General: No focal deficit present  Mental Status: She is alert  Mental status is at baseline  Gait: Gait abnormal    Psychiatric:         Mood and Affect: Mood normal          Behavior: Behavior normal        I have personally reviewed pertinent films in PACS and my interpretation is as follows  X-rays left tibi from today show well aligned fracture site with intact appearance of intramedullary nail and locking screws    There is callus formation starting to show up on the AP and lateral views

## 2021-03-10 ENCOUNTER — OFFICE VISIT (OUTPATIENT)
Dept: PHYSICAL THERAPY | Age: 31
End: 2021-03-10
Payer: COMMERCIAL

## 2021-03-10 DIAGNOSIS — Z98.890 STATUS POST OPEN REDUCTION AND INTERNAL FIXATION (ORIF) OF FRACTURE: Primary | ICD-10-CM

## 2021-03-10 DIAGNOSIS — Z87.81 STATUS POST OPEN REDUCTION AND INTERNAL FIXATION (ORIF) OF FRACTURE: Primary | ICD-10-CM

## 2021-03-10 PROCEDURE — 97110 THERAPEUTIC EXERCISES: CPT | Performed by: PHYSICAL THERAPIST

## 2021-03-10 PROCEDURE — 97140 MANUAL THERAPY 1/> REGIONS: CPT | Performed by: PHYSICAL THERAPIST

## 2021-03-10 PROCEDURE — 97750 PHYSICAL PERFORMANCE TEST: CPT | Performed by: PHYSICAL THERAPIST

## 2021-03-10 NOTE — PROGRESS NOTES
PT Evaluation  / PT Reassessment    Today's date: 3/10/2021  Patient name: Levar Sterling  : 1990  MRN: [id number]  Referring provider: Nena Ordonez MD  Dx:   Encounter Diagnosis     ICD-10-CM    1  Status post left tibia IM nail insertion   [id number]     [id number]                   Assessment  Assessment details: PT Reassessment: 03/10/2021  Patient reported the following progress since onset of PT: decrease in le pain, able to put more weight on left le without pain aggravation, walking improved, left le mobility improved  But, she noted the following deficits that still persist: walking, standing, transfers, balance, left le mobility and strength deficits, able to tolerate 60 % of weight on left le during left le stance phase  Patient noted she is left le WBAT in CAM boot as per Dr Hector Vora office visit  PT IE:2021  Patient reported on 2021 she had a ski accident while at [facility]  Patient noted she was twisted when she fell  Patient noted she was taken to [facility]  Patient noted she had surgery on 2021  Patient noted she was d/c to home on 2021  Patient noted she was d/c to home  Patient noted she was 4 weeks NWB  Patient noted she is now WBAT and uses RW  Patient noted she is still on lovenox  Patient noted she has dependent ruber  Patient had lateral sided left great toe is numb as well as numbness at the 4 incision sites  Patient noted 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[facility] [facility]  Patient noted she is concerned about returning to work  Patient noted she is unable to drive  Patient noted she is using a shower chair and grab bars  Patient noted she needs RW with all gait and standing activities  Patient noted sleep remains deprived due to left ankle pain  Patient noted left ankle remains edematous    Patient noted she has left lower distal anterior leg, anterior left ankle dorsum and left knee   Patient noted right knee is painful since injury as well  Impairments: abnormal gait, abnormal or restricted ROM, abnormal movement, activity intolerance, lacks appropriate home exercise program and pain with function  Understanding of Dx/Px/POC: excellentPrognosis details: Patient is a 26y o  year old female seen for outpatient PT reevaluation with pain, mobility and functional deficits due to left ankle and knee pain due to ski accident that caused left tibial and fibular fracture that required subsequent s/p ORIF left tibial fracture  Patient presents with the following progress since onset of PT: decrease in left le pain, increase in left le rom and strength, decrease in TTP, transfer improvements, gait improvements, decrease in left le edema, and functional mobility progress  But, she presents to PT on reassessment with the following problems, concerns, deficits and impairments: left ankle and foot pain, decreased left le range of motion, decreased left le strength, left le edema, + TTP, gait and stair dysfunctions, transfer dysfunctions, functional limitations and decreased tolerance to activity  Patient would benefit from skilled PT services under the following PT treatment plan to address the above noted deficits: therapeutic exercises and activities to facilitate left le rom and strength, modalities, manual therapy techniques, Kinesio taping techniques, IASTM techiques, gait and stair training, transfer training, balance and proprioception activities and a hep  Thank you for the referral      Goals  Short Term goals 4 - 6 weeks  1  Patient will be independent HEP   MET  2   Patient will report a 25 - 50% decrease in pain complaints  Partially MET  3   Increase strength 1/2 grade  MET  4   Increase ROM 5-10 degrees  MET  Long Term goals 8 - 12 weeks  1  Patient will report elimination of pain complaints  Partially MET  2   Patient will return to all work related activities without restriction  NOT MET    3   Patient will return to all recreational activities without restriction  NOT MET  4   ROM WFL  Partially MET  5   Strength 5/5  Partially MET  6   Patient will ambulation without an assistive device  NOT MET  7   Patient will perform stair ascent and descent in a reciprocal pattern with unilateral railing  NOT MET  8   Patient will perform all car, chair and bed transfers without left knee and ankle pain aggravation  Partially MET  9   Patient will exhibit left le edema down by > 10 mm  MET  10   Patient will be shower independently in standing  NOT MET  11  Patient will be able to get dressed without left knee and ankle pain limitations  Partially MET  12   Patient will exhibit TUG less than 12 seconds  NOT MET  13   Patient will be - TTP  Partially MET  Plan  Patient would benefit from: skilled physical therapy  Planned modality interventions: cryotherapy, TENS, thermotherapy: hydrocollator packs, ultrasound and unattended electrical stimulation  Planned therapy interventions: joint mobilization, manual therapy, massage, aquatic therapy, balance, balance/weight bearing training, neuromuscular re-education, patient education, postural training, body mechanics training, self care, strengthening, compression, stretching, therapeutic activities, therapeutic exercise, therapeutic training, transfer training, flexibility, functional ROM exercises, gait training, graded activity, graded exercise, graded motor and home exercise program  Frequency: 1x week  Duration in visits: 12  Treatment plan discussed with: patient and family        Subjective Evaluation    History of Present Illness  Mechanism of injury: Patient's PMHx is unremarkable  Left le WBAT  X-ray 1/21/2021      LEFT TIBIA AND FIBULA     INDICATION:   S82 202A: Unspecified fracture of shaft of left tibia, initial encounter for closed fracture  S82 402A: Unspecified fracture of shaft of left fibula, initial encounter for closed fracture      COMPARISON: 2021     VIEWS:  XR TIBIA FIBULA 2 VW LEFT         FINDINGS:     Anatomic alignment status post ORIF for a mid to distal 3rd diaphysis tibial fracture with butterfly component  IM lyn in proximal/distal interlocking screws noted without hardware complication  Stable minimally displaced proximal fibular diaphysis   fracture      No significant degenerative changes      No lytic or blastic osseous lesion      Soft tissues are unremarkable      IMPRESSION:     Near-anatomic alignment status post ORIF for a left tibial fracture without hardware complication  Stable fibular fracture  Pain  At best pain ratin  At worst pain ratin  Location: left knee distal tibia and ankle dorsum  Patient Goals  Patient goals for therapy: decreased edema, decreased pain, improved balance, increased motion, increased strength, independence with ADLs/IADLs, return to sport/leisure activities and return to work          Objective     Tenderness     Additional Tenderness Details  Patient is + minimal to moderate TTP at 4 incision sites that are healing by primary intention  Patient is + moderate TTP at mid to distal tibial region and left peripatellar region      Active Range of Motion   Left Hip   Flexion: 88 degrees with pain  Abduction: 16 degrees with pain    Right Hip   Flexion: 108 degrees   Abduction: 24 degrees   Left Knee   Flexion: 102 degrees with pain  Extension: -10 degrees with pain  Extensor la degrees with pain    Right Knee   Flexion: 132 degrees with pain  Extension: -4 degrees with pain  Extensor la degrees   Left Ankle/Foot   Dorsiflexion (ke): 0 degrees   Plantar flexion: 42 degrees     Right Ankle/Foot   Dorsiflexion (ke): 14 degrees   Plantar flexion: 46 degrees     Strength/Myotome Testing     Left Hip   Planes of Motion   Flexion: 3+  Extension: 3+  Abduction: 3+  Adduction: 4-    Right Hip   Planes of Motion   Flexion: 4  Extension: 4+  Abduction: 4+  Adduction: 5    Left Knee   Flexion: 3  Extension: 3-    Right Knee   Flexion: 4+  Extension: 5    Left Ankle/Foot   Dorsiflexion: 2+  Plantar flexion: 3+    Right Ankle/Foot   Dorsiflexion: 4+  Plantar flexion: 5    Ambulation     Ambulation: Level Surfaces   Ambulation with assistive device: independent    Additional Level Surfaces Ambulation Details  Patient ambulates with RW with CAM boot with left step through gait pattern at 75 % stepping past of right le past left le with approximately 50-60  % weight baring with foot flat into stance phase  Ambulation: Stairs   Ascend stairs: unable  Descend stairs: unable    General Comments: Ankle/Foot Comments   Girth Measurements:    Left ankle / Knee:  Superior to medial and lateral malleolus: 20 1 cm  10 cm above lateral malleolus: 24 0 cm    20 cm above lateral malleolus: 31 1 cm  Suprapatellar region: 35 0 cm                   Precautions: Patient is Left LE WBAT in CAM boot      Manuals 2/11 2/24 3/1 3/10         Left knee and ankle prom  10 min total  10 min total         Left le retrograde / Desentization massage  10 min total 10 min along with Kinesio taping in a "Y" pattern with base above patella and moving distal at medial and lateral patella at 25 % tension with pt educated that we do not utilize tape on the incision 10 min along with Kinesio taping in a "Y" pattern with base above patella and moving distal at medial and lateral patella at 25 % tension with pt educated that we do not utilize tape on the incision                                   Neuro Re-Ed                                                                                                        Ther Ex             Nu step  8 min 10 min 10 min         Seated toe curls:L    3 sec x 20         Seated hr and tr:L:  NT NT 2 x 10         Seated heel slides:L:  NT NT 2 x 10         Seated LAQ:L 5 x 10 x  2 x 10 3 sec x 20         Seated hip flexion:L  10 x  2 x 10 2 x 10         Supine gastroc stretch with strap  NT 20 sec x 5 20 sec x 5         Supine hamstring stretch:L  NT 20 sec x 5 20 sec x 5         Quad set:L 5 x 3 sec x 10 3 sec x 20 3 sec x 20         Ankle pumps:L 5 x  10 x  10 x 2 2 x 10         Heel slides:L 5 x  10 x  2 x 10 2 x 10         slr flexion:L: aarom with strap aarom with PT 10 x  aarom x 10 2 x 10         SAQ:B:  10 x  2 x 10 NT         Bridges with bolster  10 x  2 x 10 NT         Hip abduction in right side lying:L  NT NT NT         Hip extension in prone:L  10 x  NT NT         Prone knee flexion self stretch:L  NT NT NT         Prone knee flexion aromL  10 x  NT NT         Prone knee TKE:L:  NT NT NT                      All standing activities with bilateral ue support! Standing slr x 3:L 5 x L only L x 10 2 x 10 L and 2 x 5 R 10 x on each le         Standing hr and tr:B:  NT NT NT         Standing weight shift left and right with bilateral ue support!   B x 10 10 x  NT         Standing hamstring curls:L  NT NT 10 x          Standing mini squats to chair  NT NT NT         Standing lunge on foam block:L  NT NT NT         Standing tandem stance  NT NT NT         Forward step ups:L 2" NT NT 4" x 10         Lateral step ups:L 2"  NT NT NT                                                             Ther Activity                                       Gait Training                                       Modalities             Cp to left knee and ankle supine  NT

## 2021-03-22 ENCOUNTER — TELEPHONE (OUTPATIENT)
Dept: OBGYN CLINIC | Facility: HOSPITAL | Age: 31
End: 2021-03-22

## 2021-03-22 NOTE — TELEPHONE ENCOUNTER
Patient sees Dr Stacey Stuart  She called to check on the status of her FMLA paperwork  Advised her that is was received on 3/10 and takes 10-14 days to complete

## 2021-03-24 ENCOUNTER — APPOINTMENT (OUTPATIENT)
Dept: PHYSICAL THERAPY | Age: 31
End: 2021-03-24
Payer: COMMERCIAL

## 2021-03-31 ENCOUNTER — APPOINTMENT (OUTPATIENT)
Dept: PHYSICAL THERAPY | Age: 31
End: 2021-03-31
Payer: COMMERCIAL

## 2021-04-05 DIAGNOSIS — Z48.89 AFTERCARE FOLLOWING SURGERY: Primary | ICD-10-CM

## 2021-04-14 ENCOUNTER — HOSPITAL ENCOUNTER (OUTPATIENT)
Dept: RADIOLOGY | Facility: HOSPITAL | Age: 31
Discharge: HOME | End: 2021-04-14
Attending: ORTHOPAEDIC SURGERY
Payer: COMMERCIAL

## 2021-04-14 ENCOUNTER — OFFICE VISIT (OUTPATIENT)
Dept: OBGYN CLINIC | Facility: HOSPITAL | Age: 31
End: 2021-04-14

## 2021-04-14 VITALS
HEART RATE: 86 BPM | BODY MASS INDEX: 20.01 KG/M2 | WEIGHT: 117.2 LBS | HEIGHT: 64 IN | DIASTOLIC BLOOD PRESSURE: 78 MMHG | SYSTOLIC BLOOD PRESSURE: 115 MMHG

## 2021-04-14 DIAGNOSIS — Z98.890 STATUS POST OPEN REDUCTION AND INTERNAL FIXATION (ORIF) OF FRACTURE: Primary | ICD-10-CM

## 2021-04-14 DIAGNOSIS — Z87.81 STATUS POST OPEN REDUCTION AND INTERNAL FIXATION (ORIF) OF FRACTURE: Primary | ICD-10-CM

## 2021-04-14 DIAGNOSIS — Z48.89 AFTERCARE FOLLOWING SURGERY: ICD-10-CM

## 2021-04-14 PROCEDURE — 99024 POSTOP FOLLOW-UP VISIT: CPT | Performed by: ORTHOPAEDIC SURGERY

## 2021-04-14 PROCEDURE — 73590 X-RAY EXAM OF LOWER LEG: CPT

## 2021-04-14 NOTE — PROGRESS NOTES
Subjective;     patient is several months status post intramedullary nail fixation of tibial shaft fractures left lower extremity  She is accompanied by male individual in the room   she had x-rays taken on arrival   she continues to wear Cam walking boot to her left lower extremity and the use of a handheld cane  she reports very little pain or discomfort   she does have focal pain to the distal most screw insertion site medial ankle   she also has discomfort of the proximal incision lateral to the patellar tendon focal to the patellar tendon and the soft tissues  History reviewed  No pertinent past medical history  Past Surgical History:   Procedure Laterality Date    AL TREAT TIBIAL SHAFT FX, INTRAMED IMPLANT Left 1/17/2021    Procedure: INSERTION NAIL IM TIBIA;  Surgeon: Nancy Segal MD;  Location: [facility];  Service: Orthopedics       History reviewed  No pertinent family history  Social History     Tobacco Use    Smoking status: Never Smoker    Smokeless tobacco: Never Used   Substance Use Topics    Alcohol use: Never     Frequency: Never    Drug use: Never      exam;     her limb or left lower extremity shows anatomic shape, no swelling  she has incisional tenderness of the proximal most vertical incision lateral to the patellar tendon  She also the knee flexed approximately 10-15 degrees and is resistant to terminal extension she states that there is discomfort in the area the patellar tendon when she performs this  Assisted by the examiner I can facilitate terminal extension   she has no palpable pain of the mid shaft of her left tibia  she does have a prominent screw head that is palpable distal most locking screw site medial left ankle       her left ankle range of motion is excellent her left foot can dorsiflexed past neutral and plantar flexes without pain     x-rays;     x-rays two views tib-fib show evidence of significant healing and callus formation there is less lucency on the lateral projection of the mid shaft butterfly fragment  impression;     several months status post,  IM nail fixation of left tibial shaft fractures  plan;     she may  discontinue the Cam walker   she should continue to restore range of motion of the knee   we will see her in follow-up in 6 additional weeks  she is weight-bearing as tolerated without a protective device to left lower extremity

## 2021-05-20 DIAGNOSIS — Z48.89 AFTERCARE FOLLOWING SURGERY: Primary | ICD-10-CM

## 2021-05-27 ENCOUNTER — HOSPITAL ENCOUNTER (OUTPATIENT)
Dept: RADIOLOGY | Facility: HOSPITAL | Age: 31
Discharge: HOME | End: 2021-05-27
Attending: ORTHOPAEDIC SURGERY
Payer: COMMERCIAL

## 2021-05-27 ENCOUNTER — OFFICE VISIT (OUTPATIENT)
Dept: OBGYN CLINIC | Facility: HOSPITAL | Age: 31
End: 2021-05-27
Payer: COMMERCIAL

## 2021-05-27 VITALS
HEART RATE: 94 BPM | BODY MASS INDEX: 19.12 KG/M2 | SYSTOLIC BLOOD PRESSURE: 113 MMHG | HEIGHT: 64 IN | DIASTOLIC BLOOD PRESSURE: 73 MMHG | WEIGHT: 112 LBS

## 2021-05-27 DIAGNOSIS — Z87.81 STATUS POST OPEN REDUCTION AND INTERNAL FIXATION (ORIF) OF FRACTURE: ICD-10-CM

## 2021-05-27 DIAGNOSIS — Z48.89 AFTERCARE FOLLOWING SURGERY: Primary | ICD-10-CM

## 2021-05-27 DIAGNOSIS — Z48.89 AFTERCARE FOLLOWING SURGERY: ICD-10-CM

## 2021-05-27 DIAGNOSIS — Z98.890 STATUS POST OPEN REDUCTION AND INTERNAL FIXATION (ORIF) OF FRACTURE: ICD-10-CM

## 2021-05-27 PROCEDURE — 99213 OFFICE O/P EST LOW 20 MIN: CPT | Performed by: ORTHOPAEDIC SURGERY

## 2021-05-27 PROCEDURE — 73590 X-RAY EXAM OF LOWER LEG: CPT

## 2021-05-27 NOTE — LETTER
May 27, 2021     Patient: Po Gomez   YOB: 1990   Date of Visit: 5/27/2021       To Whom it May Concern:    Po Gomez is under my professional care  She was seen in my office on 5/27/2021  The patient should remain out of work until 6/16/2021 in which she should return with no restrictions  If you have any questions or concerns, please don't hesitate to call           Sincerely,          Snehal Soria MD        CC: No Recipients

## 2021-05-27 NOTE — LETTER
May 27, 2021     Patient: Itz Barker   YOB: 1990   Date of Visit: 5/27/2021       To Whom it May Concern:    Itz Barker is under my professional care  She was seen in my office on 5/27/2021  The patient should return to work 6/16/2021 with no restrictions  If you have any questions or concerns, please don't hesitate to call           Sincerely,          Olga Delaney MD        CC: No Recipients

## 2021-05-27 NOTE — LETTER
May 27, 2021     Patient: Ann Hwang   YOB: 1990   Date of Visit: 5/27/2021       To Whom it May Concern:    Ann Hwang is under my professional care  She was seen in my office on 5/27/2021  The patient should remain out of work until 6/16/2021 in which she should return with no restrictions  If you have any questions or concerns, please don't hesitate to call           Sincerely,          Arie Anthony MD        CC: No Recipients

## 2021-05-27 NOTE — PROGRESS NOTES
Assessment:  1  Aftercare following surgery     2  Status post left tibia IM nail insertion          Plan:  The patient is doing well  She is encouraged to return to baseline activity  She should return to work 6/16/2021  The patient can follow up as needed and is welcome to return at any point with any new or old issue  To do next visit:  Return if symptoms worsen or fail to improve  The above stated was discussed in layman's terms and the patient expressed understanding  All questions were answered to the patient's satisfaction  Scribe Attestation    I,:  Kin Theodore am acting as a scribe while in the presence of the attending physician :       I,:  Nany Mcelroy MD personally performed the services described in this documentation    as scribed in my presence :             Subjective:   Mechelle Garces is a 33 y o  female who presents 4 months s/p left tibial nail insertion, 1/17/2021  She is doing well  Today she has no left knee or ankle pain complaints  She does feel that she limps  She does continue with HEP program with assistance of family member physical therapist         Review of systems negative unless otherwise specified in HPI    History reviewed  No pertinent past medical history  Past Surgical History:   Procedure Laterality Date    [place] TREAT TIBIAL SHAFT FX, INTRAMED IMPLANT Left 1/17/2021    Procedure: INSERTION NAIL IM TIBIA;  Surgeon: Nany Mcelroy MD;  Location: [facility];  Service: Orthopedics       History reviewed  No pertinent family history      Social History     Occupational History    Not on file   Tobacco Use    Smoking status: Never Smoker    Smokeless tobacco: Never Used   Substance and Sexual Activity    Alcohol use: Never     Frequency: Never    Drug use: Never    Sexual activity: Yes     Partners: Male         Current Outpatient Medications:     docusate sodium (COLACE) 100 mg capsule, Take 1 capsule (100 mg total) by mouth 2 (two) times a day, Disp: 10 capsule, Rfl: 0    methocarbamol (ROBAXIN) 500 mg tablet, Take 1 tablet (500 mg total) by mouth 3 (three) times a day, Disp: 30 tablet, Rfl: 0    nabumetone (RELAFEN) 750 mg tablet, Take 1 tablet (750 mg total) by mouth 2 (two) times a day, Disp: 30 tablet, Rfl: 0    oxyCODONE (ROXICODONE) 5 mg immediate release tablet, 1 pill po Q4 Hrs prn, Disp: 30 tablet, Rfl: 0    senna (SENOKOT) 8 6 mg, Take 1 tablet (8 6 mg total) by mouth daily at bedtime for 14 days, Disp: 14 tablet, Rfl: 0    No Known Allergies         Vitals:    05/27/21 1603   BP: 113/73   Pulse: 94       Objective:  Physical exam  · General: Awake, Alert, Oriented  · Eyes: Pupils equal, round and reactive to light  · Heart: regular rate and rhythm  · Lungs: No audible wheezing  · Abdomen: soft                    Ortho Exam  Left lower extremity:  Mild quadriceps atrophy  Well healed incisions  Normal ROM of knee  Good ROM in all planes of ankle  Calf compartments soft and supple  Sensation intact  Toes are warm sensate and mobile      Diagnostics, reviewed and taken today if performed as documented: The attending physician has personally reviewed the pertinent films in PACS and interpretation is as follows:  Left tibia x-ray:  Well healed fracture site with no evidence of hardware failure  Procedures, if performed today:    Procedures    None performed      Portions of the record may have been created with voice recognition software  Occasional wrong word or "sound a like" substitutions may have occurred due to the inherent limitations of voice recognition software  Read the chart carefully and recognize, using context, where substitutions have occurred

## (undated) DEVICE — BULB SYRINGE,IRRIGATION WITH PROTECTIVE CAP: Brand: DOVER

## (undated) DEVICE — GAUZE SPONGES,16 PLY: Brand: CURITY

## (undated) DEVICE — PADDING CAST 4 IN  COTTON STRL

## (undated) DEVICE — 2.5MM REAMING ROD WITH BALL TIP/950MM-STERILE

## (undated) DEVICE — 3.2MM THREE-FLUTED DRILL BIT QC/330MM/100MM CALIBRATION

## (undated) DEVICE — ACE WRAP 6 IN UNSTERILE

## (undated) DEVICE — PADDING CAST 6IN COTTON STRL

## (undated) DEVICE — DRAPE C-ARM X-RAY

## (undated) DEVICE — GLOVE SRG BIOGEL 8

## (undated) DEVICE — GLOVE INDICATOR PI UNDERGLOVE SZ 8.5 BLUE

## (undated) DEVICE — SILVER-COATED ANTIMICROBIAL BARRIER DRESSING: Brand: ACTICOAT   4" X 8"

## (undated) DEVICE — 3.2MM GUIDE WIRE 400MM

## (undated) DEVICE — DRAPE SHEET THREE QUARTER

## (undated) DEVICE — STOCKINETTE REGULAR

## (undated) DEVICE — SPONGE PVP SCRUB WING STERILE

## (undated) DEVICE — 3.2MM THREE-FLUTED DRILL BIT QC/NEEDLE POINT/145MM

## (undated) DEVICE — 3.2MM GUIDE WIRE 290MM

## (undated) DEVICE — CHLORAPREP HI-LITE 26ML ORANGE

## (undated) DEVICE — PLUMEPEN PRO 10FT

## (undated) DEVICE — ACE WRAP 6 IN STERILE

## (undated) DEVICE — PROXIMATE PLUS MD MULTI-DIRECTIONAL RELEASE SKIN STAPLERS CONTAINS 35 STAINLESS STEEL STAPLES APPROXIMATE CLOSED DIMENSIONS: 6.9MM X 3.9MM WIDE: Brand: PROXIMATE

## (undated) DEVICE — DRAPE EQUIPMENT RF WAND

## (undated) DEVICE — KERLIX BANDAGE ROLL: Brand: KERLIX

## (undated) DEVICE — UNIVERSAL MAJOR EXTREMITY,KIT: Brand: CARDINAL HEALTH

## (undated) DEVICE — ABDOMINAL PAD: Brand: DERMACEA

## (undated) DEVICE — PAD GROUNDING ADULT